# Patient Record
Sex: MALE | Race: OTHER | NOT HISPANIC OR LATINO | URBAN - METROPOLITAN AREA
[De-identification: names, ages, dates, MRNs, and addresses within clinical notes are randomized per-mention and may not be internally consistent; named-entity substitution may affect disease eponyms.]

---

## 2017-04-14 ENCOUNTER — OUTPATIENT (OUTPATIENT)
Dept: OUTPATIENT SERVICES | Facility: HOSPITAL | Age: 57
LOS: 1 days | Discharge: HOME | End: 2017-04-14

## 2017-04-17 LAB — SURGICAL PATHOLOGY STUDY: SIGNIFICANT CHANGE UP

## 2017-04-18 LAB — COMPN STONE: SIGNIFICANT CHANGE UP

## 2017-04-19 DIAGNOSIS — K21.9 GASTRO-ESOPHAGEAL REFLUX DISEASE WITHOUT ESOPHAGITIS: ICD-10-CM

## 2017-04-19 DIAGNOSIS — E11.9 TYPE 2 DIABETES MELLITUS WITHOUT COMPLICATIONS: ICD-10-CM

## 2017-04-19 DIAGNOSIS — E78.5 HYPERLIPIDEMIA, UNSPECIFIED: ICD-10-CM

## 2017-04-19 DIAGNOSIS — I10 ESSENTIAL (PRIMARY) HYPERTENSION: ICD-10-CM

## 2017-04-19 DIAGNOSIS — N40.0 BENIGN PROSTATIC HYPERPLASIA WITHOUT LOWER URINARY TRACT SYMPTOMS: ICD-10-CM

## 2017-04-19 DIAGNOSIS — Z79.84 LONG TERM (CURRENT) USE OF ORAL HYPOGLYCEMIC DRUGS: ICD-10-CM

## 2017-04-19 DIAGNOSIS — N20.1 CALCULUS OF URETER: ICD-10-CM

## 2020-07-11 PROBLEM — Z87.898 HISTORY OF ELEVATED PROSTATE SPECIFIC ANTIGEN (PSA): Status: RESOLVED | Noted: 2020-07-11 | Resolved: 2020-07-11

## 2020-07-11 PROBLEM — N20.9 UROLITHIASIS: Status: ACTIVE | Noted: 2020-07-11

## 2020-07-13 ENCOUNTER — APPOINTMENT (OUTPATIENT)
Dept: UROLOGY | Facility: CLINIC | Age: 60
End: 2020-07-13
Payer: COMMERCIAL

## 2020-07-13 VITALS
HEART RATE: 76 BPM | DIASTOLIC BLOOD PRESSURE: 86 MMHG | TEMPERATURE: 97.7 F | HEIGHT: 72 IN | BODY MASS INDEX: 27.77 KG/M2 | SYSTOLIC BLOOD PRESSURE: 135 MMHG | WEIGHT: 205 LBS

## 2020-07-13 VITALS — TEMPERATURE: 97.7 F

## 2020-07-13 DIAGNOSIS — Z80.1 FAMILY HISTORY OF MALIGNANT NEOPLASM OF TRACHEA, BRONCHUS AND LUNG: ICD-10-CM

## 2020-07-13 DIAGNOSIS — Z78.9 OTHER SPECIFIED HEALTH STATUS: ICD-10-CM

## 2020-07-13 DIAGNOSIS — Z87.898 PERSONAL HISTORY OF OTHER SPECIFIED CONDITIONS: ICD-10-CM

## 2020-07-13 DIAGNOSIS — K21.9 GASTRO-ESOPHAGEAL REFLUX DISEASE W/OUT ESOPHAGITIS: ICD-10-CM

## 2020-07-13 DIAGNOSIS — I86.1 SCROTAL VARICES: ICD-10-CM

## 2020-07-13 DIAGNOSIS — N20.9 URINARY CALCULUS, UNSPECIFIED: ICD-10-CM

## 2020-07-13 LAB
BILIRUB UR QL STRIP: NORMAL
CLARITY UR: CLEAR
COLLECTION METHOD: NORMAL
GLUCOSE UR-MCNC: NORMAL
HCG UR QL: 0.2 EU/DL
HGB UR QL STRIP.AUTO: NORMAL
KETONES UR-MCNC: NORMAL
LEUKOCYTE ESTERASE UR QL STRIP: NORMAL
NITRITE UR QL STRIP: NORMAL
PH UR STRIP: 6
PROT UR STRIP-MCNC: NORMAL
SP GR UR STRIP: 1

## 2020-07-13 PROCEDURE — 81003 URINALYSIS AUTO W/O SCOPE: CPT | Mod: QW

## 2020-07-13 PROCEDURE — 76857 US EXAM PELVIC LIMITED: CPT

## 2020-07-13 PROCEDURE — 99205 OFFICE O/P NEW HI 60 MIN: CPT | Mod: 25

## 2020-07-13 RX ORDER — METFORMIN HYDROCHLORIDE 500 MG/1
500 TABLET, COATED ORAL
Qty: 180 | Refills: 0 | Status: ACTIVE | COMMUNITY
Start: 2019-12-29

## 2020-07-13 RX ORDER — EPLERENONE 25 MG/1
25 TABLET, COATED ORAL
Qty: 180 | Refills: 0 | Status: ACTIVE | COMMUNITY
Start: 2020-03-05

## 2020-07-13 RX ORDER — RABEPRAZOLE SODIUM 20 MG/1
20 TABLET, DELAYED RELEASE ORAL
Qty: 90 | Refills: 0 | Status: ACTIVE | COMMUNITY
Start: 2020-03-16

## 2020-07-13 NOTE — PHYSICAL EXAM
[Normal Appearance] : normal appearance [General Appearance - Well Nourished] : well nourished [General Appearance - Well Developed] : well developed [Well Groomed] : well groomed [General Appearance - In No Acute Distress] : no acute distress [Edema] : no peripheral edema [Respiration, Rhythm And Depth] : normal respiratory rhythm and effort [Exaggerated Use Of Accessory Muscles For Inspiration] : no accessory muscle use [Abdomen Tenderness] : non-tender [Abdomen Soft] : soft [Abdomen Mass (___ Cm)] : no abdominal mass palpated [Abdomen Hernia] : no hernia was discovered [Costovertebral Angle Tenderness] : no ~M costovertebral angle tenderness [Urethral Meatus] : meatus normal [Urinary Bladder Findings] : the bladder was normal on palpation [Scrotum] : the scrotum was normal [Penis Abnormality] : normal circumcised penis [Testes Tenderness] : no tenderness of the testes [Epididymis] : the epididymides were normal [No Prostate Nodules] : no prostate nodules [Prostate Tenderness] : the prostate was not tender [Testes Mass (___cm)] : there were no testicular masses [Normal Station and Gait] : the gait and station were normal for the patient's age [] : no rash [Skin Color & Pigmentation] : normal skin color and pigmentation [Affect] : the affect was normal [No Focal Deficits] : no focal deficits [Oriented To Time, Place, And Person] : oriented to person, place, and time [Not Anxious] : not anxious [Mood] : the mood was normal [No Palpable Adenopathy] : no palpable adenopathy [FreeTextEntry1] : as above

## 2020-07-13 NOTE — ASSESSMENT
[FreeTextEntry1] : I discussed the findings and options with Mr. ALLEN GILMAN in detail.\par He will continue with the Viagra 50 mg prn for the ED.\par Regarding the urinary retention, I recommended that he begin alfuzosin and I outlined the potential common side effects.\par \par I will call Mr. Gilman with his PSA.  Providing it is normal, I would like to see him in 6mths (bladder sono). In the meantime, I have ordered a renal sonogram to evaluate the history of urolithiasis.

## 2020-07-13 NOTE — LETTER BODY
[Dear  ___] : Dear  [unfilled], [Sincerely,] : Sincerely, [Please see my note below.] : Please see my note below. [Consult Closing:] : Thank you very much for allowing me to participate in the care of this patient.  If you have any questions, please do not hesitate to contact me. [FreeTextEntry3] : Ab Simon MD, FACS\par

## 2020-07-13 NOTE — HISTORY OF PRESENT ILLNESS
[FreeTextEntry1] : Mr. ALLEN GILMAN comes in today for a urologic evaluation.  He presents with moderate lower urinary tract symptoms (obstructive and irritative).\par IPSS: 12/35\par Sono: 291cc PVR; 39cc prostate\par \par Mr. Gilman also has a history of urolithiasis and underwent ureteroscopies in December 2015 and April 2017.  He has been asymptomatic.\par \par His erectile dysfunction is managed with sildenafil.  Although he gets a headache from this, the medication is effective and he uses it.\par \par PSAs:  6/5/19--2.25; 4/19/18--1.37; 4/28/10--5.2\par \par Prostate bx:  5/4/10--BPH\par \par CT abd/pelvis:  4/8/17--8mm right UVJ stone (830HU); 11/13/15--7mm left distal ureteral stone.\par \par MSE 12/6/16--Elevated urine calcium and oxalate\par \par Stone analysis:  12/2/15--1000% Ca++oxalate \par \par

## 2020-07-14 LAB — PSA SERPL-MCNC: 3.14 NG/ML

## 2020-08-20 DIAGNOSIS — Z00.00 ENCOUNTER FOR GENERAL ADULT MEDICAL EXAMINATION W/OUT ABNORMAL FINDINGS: ICD-10-CM

## 2020-09-01 ENCOUNTER — APPOINTMENT (OUTPATIENT)
Dept: UROLOGY | Facility: CLINIC | Age: 60
End: 2020-09-01
Payer: COMMERCIAL

## 2020-09-01 ENCOUNTER — OUTPATIENT (OUTPATIENT)
Dept: OUTPATIENT SERVICES | Facility: HOSPITAL | Age: 60
LOS: 1 days | End: 2020-09-01
Payer: COMMERCIAL

## 2020-09-01 ENCOUNTER — APPOINTMENT (OUTPATIENT)
Dept: CT IMAGING | Facility: IMAGING CENTER | Age: 60
End: 2020-09-01
Payer: COMMERCIAL

## 2020-09-01 VITALS — TEMPERATURE: 97.4 F

## 2020-09-01 DIAGNOSIS — N28.89 OTHER SPECIFIED DISORDERS OF KIDNEY AND URETER: ICD-10-CM

## 2020-09-01 DIAGNOSIS — Z86.79 PERSONAL HISTORY OF OTHER DISEASES OF THE CIRCULATORY SYSTEM: ICD-10-CM

## 2020-09-01 DIAGNOSIS — Z86.39 PERSONAL HISTORY OF OTHER ENDOCRINE, NUTRITIONAL AND METABOLIC DISEASE: ICD-10-CM

## 2020-09-01 PROCEDURE — 71260 CT THORAX DX C+: CPT

## 2020-09-01 PROCEDURE — 99214 OFFICE O/P EST MOD 30 MIN: CPT

## 2020-09-01 PROCEDURE — 71260 CT THORAX DX C+: CPT | Mod: 26

## 2020-09-01 PROCEDURE — 82565 ASSAY OF CREATININE: CPT

## 2020-09-01 NOTE — HISTORY OF PRESENT ILLNESS
[FreeTextEntry1] : Patient is a 59 yo male with incidentally discovered 5.5 left renal mass on CT obtained for evaluation of kidney stones. Patient has history of nephrolithiasis with last intervention in 2017. Patient denies pain, hematuria, dysuria. Patient feels well currently.\par Patient with no smoking history, though father smoking\par NKDA\par Family history of lung cancer - father\par D2M,  HTN, stefan-orbital edema

## 2020-09-01 NOTE — ASSESSMENT
[FreeTextEntry1] : Pt is a 59 yo male with a history of left cystic renal lesion discovered incidentally on CT, 5.5 cm in size. Small nodules in lung.\par - Chest CT.\par - Discussed possible removal. \par \par renal mass\par We reviewed the images and reports . We reviewed the possibe underlying histology of solid enhancing renal masses . We discussed the risk of malignancy  vs benign . We discussed the possibility/ role of percutaneous biopsy ,with the associated risks, benefits , complications and accuracy issues ( risk of false negative). \par The natural history and biology of renal cell carcinoma was discussed. \par Options were reviewed including ,not limited to,active surveillance, surgical extirpation and ablation . The risk of tumor growth and metastasis on active surveillance were reviewed .\par Options were reviewed including , not limited to,active surveillance ( AS ) surgical extirpation and ablation. The risk of tumor growth and metastasis on AS could mean missing the opportunity for cure was discussed \par With respect to treatment we reviewed  ablation ( cryoablation, radiofrequency ablation) risks of recurrence,opportunities for salvage treatment and imaging requirements for follow up based on he pathological findings . Oncologic outcomes for ablation were reviewed .\par With respect for surgery we reviewed nephron sparing surgery vs radical nephrectomy  as well as minimally invasive laparoscopic  approach surgery and the possibility of converting to an open procedure . \par Risks of surgical complications were reviewed, including but not limited to : bleeding/ life threatening hemorrhage, vascular/bowel/adjacent visceral organ injury,trocar access injury, the possibility of recognized vs unrecognized delayed recognition injury, risks of thermal /blunt/sharp/retraction injury. We reviewed the risk of DVT, PE, MI ,death,risks of cardiopulmonary/anesthesia related complications,positional injury,infection/collection/abscess,wound complications/dehiscence,urinoma/fistula, ureteral injury/obstruction as well as other complications \par Written materials regarding preop clear liquid diet and bowel prep were given to patient . \par Booking form entered

## 2020-09-01 NOTE — ASSESSMENT
[FreeTextEntry1] : Pt is a 61 yo male with a history of left cystic renal lesion discovered incidentally on CT, 5.5 cm in size. Small nodules in lung.\par - Chest CT.\par - Discussed possible removal. \par \par renal mass\par We reviewed the images and reports . We reviewed the possibe underlying histology of solid enhancing renal masses . We discussed the risk of malignancy  vs benign . We discussed the possibility/ role of percutaneous biopsy ,with the associated risks, benefits , complications and accuracy issues ( risk of false negative). \par The natural history and biology of renal cell carcinoma was discussed. \par Options were reviewed including ,not limited to,active surveillance, surgical extirpation and ablation . The risk of tumor growth and metastasis on active surveillance were reviewed .\par Options were reviewed including , not limited to,active surveillance ( AS ) surgical extirpation and ablation. The risk of tumor growth and metastasis on AS could mean missing the opportunity for cure was discussed \par With respect to treatment we reviewed  ablation ( cryoablation, radiofrequency ablation) risks of recurrence,opportunities for salvage treatment and imaging requirements for follow up based on he pathological findings . Oncologic outcomes for ablation were reviewed .\par With respect for surgery we reviewed nephron sparing surgery vs radical nephrectomy  as well as minimally invasive laparoscopic  approach surgery and the possibility of converting to an open procedure . \par Risks of surgical complications were reviewed, including but not limited to : bleeding/ life threatening hemorrhage, vascular/bowel/adjacent visceral organ injury,trocar access injury, the possibility of recognized vs unrecognized delayed recognition injury, risks of thermal /blunt/sharp/retraction injury. We reviewed the risk of DVT, PE, MI ,death,risks of cardiopulmonary/anesthesia related complications,positional injury,infection/collection/abscess,wound complications/dehiscence,urinoma/fistula, ureteral injury/obstruction as well as other complications \par Written materials regarding preop clear liquid diet and bowel prep were given to patient . \par Booking form entered

## 2020-09-01 NOTE — HISTORY OF PRESENT ILLNESS
[FreeTextEntry1] : Patient is a 61 yo male with incidentally discovered 5.5 left renal mass on CT obtained for evaluation of kidney stones. Patient has history of nephrolithiasis with last intervention in 2017. Patient denies pain, hematuria, dysuria. Patient feels well currently.\par Patient with no smoking history, though father smoking\par NKDA\par Family history of lung cancer - father\par D2M,  HTN, stefan-orbital edema

## 2020-09-04 ENCOUNTER — OUTPATIENT (OUTPATIENT)
Dept: OUTPATIENT SERVICES | Facility: HOSPITAL | Age: 60
LOS: 1 days | End: 2020-09-04

## 2020-09-04 VITALS
SYSTOLIC BLOOD PRESSURE: 130 MMHG | HEIGHT: 70 IN | DIASTOLIC BLOOD PRESSURE: 76 MMHG | RESPIRATION RATE: 18 BRPM | WEIGHT: 212.08 LBS | OXYGEN SATURATION: 98 % | HEART RATE: 95 BPM | TEMPERATURE: 98 F

## 2020-09-04 DIAGNOSIS — N28.89 OTHER SPECIFIED DISORDERS OF KIDNEY AND URETER: ICD-10-CM

## 2020-09-04 DIAGNOSIS — E11.9 TYPE 2 DIABETES MELLITUS WITHOUT COMPLICATIONS: ICD-10-CM

## 2020-09-04 DIAGNOSIS — K21.9 GASTRO-ESOPHAGEAL REFLUX DISEASE WITHOUT ESOPHAGITIS: ICD-10-CM

## 2020-09-04 DIAGNOSIS — Z01.818 ENCOUNTER FOR OTHER PREPROCEDURAL EXAMINATION: ICD-10-CM

## 2020-09-04 DIAGNOSIS — Z98.890 OTHER SPECIFIED POSTPROCEDURAL STATES: Chronic | ICD-10-CM

## 2020-09-04 LAB
ANION GAP SERPL CALC-SCNC: 15 MMO/L — HIGH (ref 7–14)
BLD GP AB SCN SERPL QL: NEGATIVE — SIGNIFICANT CHANGE UP
BUN SERPL-MCNC: 17 MG/DL — SIGNIFICANT CHANGE UP (ref 7–23)
CALCIUM SERPL-MCNC: 10.1 MG/DL — SIGNIFICANT CHANGE UP (ref 8.4–10.5)
CHLORIDE SERPL-SCNC: 101 MMOL/L — SIGNIFICANT CHANGE UP (ref 98–107)
CO2 SERPL-SCNC: 24 MMOL/L — SIGNIFICANT CHANGE UP (ref 22–31)
CREAT SERPL-MCNC: 1.08 MG/DL — SIGNIFICANT CHANGE UP (ref 0.5–1.3)
GLUCOSE SERPL-MCNC: 88 MG/DL — SIGNIFICANT CHANGE UP (ref 70–99)
HBA1C BLD-MCNC: 6.7 % — HIGH (ref 4–5.6)
HCT VFR BLD CALC: 39.7 % — SIGNIFICANT CHANGE UP (ref 39–50)
HGB BLD-MCNC: 13.1 G/DL — SIGNIFICANT CHANGE UP (ref 13–17)
MCHC RBC-ENTMCNC: 28.9 PG — SIGNIFICANT CHANGE UP (ref 27–34)
MCHC RBC-ENTMCNC: 33 % — SIGNIFICANT CHANGE UP (ref 32–36)
MCV RBC AUTO: 87.6 FL — SIGNIFICANT CHANGE UP (ref 80–100)
NRBC # FLD: 0 K/UL — SIGNIFICANT CHANGE UP (ref 0–0)
PLATELET # BLD AUTO: 304 K/UL — SIGNIFICANT CHANGE UP (ref 150–400)
PMV BLD: 12.2 FL — SIGNIFICANT CHANGE UP (ref 7–13)
POTASSIUM SERPL-MCNC: 4.1 MMOL/L — SIGNIFICANT CHANGE UP (ref 3.5–5.3)
POTASSIUM SERPL-SCNC: 4.1 MMOL/L — SIGNIFICANT CHANGE UP (ref 3.5–5.3)
RBC # BLD: 4.53 M/UL — SIGNIFICANT CHANGE UP (ref 4.2–5.8)
RBC # FLD: 12.7 % — SIGNIFICANT CHANGE UP (ref 10.3–14.5)
RH IG SCN BLD-IMP: POSITIVE — SIGNIFICANT CHANGE UP
SODIUM SERPL-SCNC: 140 MMOL/L — SIGNIFICANT CHANGE UP (ref 135–145)
WBC # BLD: 8.48 K/UL — SIGNIFICANT CHANGE UP (ref 3.8–10.5)
WBC # FLD AUTO: 8.48 K/UL — SIGNIFICANT CHANGE UP (ref 3.8–10.5)

## 2020-09-04 RX ORDER — SODIUM CHLORIDE 9 MG/ML
1000 INJECTION, SOLUTION INTRAVENOUS
Refills: 0 | Status: DISCONTINUED | OUTPATIENT
Start: 2020-09-09 | End: 2020-09-10

## 2020-09-04 NOTE — H&P PST ADULT - NSICDXPROBLEM_GEN_ALL_CORE_FT
PROBLEM DIAGNOSES  Problem: Other specified disorders of kidney and ureter  Assessment and Plan: Left laparoscopic partial nephrectomy   Medical clearance as per Dr Serrano   Chlorhexidine wash with verbal and written instructions , teach back appropriate.     Problem: GERD (gastroesophageal reflux disease)  Assessment and Plan: ptis npo from 11 pm the night before surgery . Pt instructed to take aciphex day of surgery . PROBLEM DIAGNOSES  Problem: Other specified disorders of kidney and ureter  Assessment and Plan: Left laparoscopic partial nephrectomy   Medical clearance as per Dr Serrano   Chlorhexidine wash with verbal and written instructions , teach back appropriate.     Problem: GERD (gastroesophageal reflux disease)  Assessment and Plan: pt is npo from 11 pm the night before surgery . Pt instructed to take aciphex day of surgery .     Problem: Type 2 diabetes mellitus  Assessment and Plan: last dose metformin 9/8 in am . OR faxed . STAT FS upon admit ,monitor FS during hospital stay .

## 2020-09-04 NOTE — H&P PST ADULT - MUSCULOSKELETAL
detailed exam ROM intact/no calf tenderness/no joint warmth/normal strength/no joint swelling/no joint erythema details…

## 2020-09-04 NOTE — H&P PST ADULT - NSICDXPASTMEDICALHX_GEN_ALL_CORE_FT
PAST MEDICAL HISTORY:  Enlarged prostate     H/O gastroesophageal reflux (GERD)     Kidney stone 2015    Type 2 diabetes mellitus

## 2020-09-04 NOTE — H&P PST ADULT - HISTORY OF PRESENT ILLNESS
This is a 60 y.o. male who presented for routine renal sono, follow -up  CT of abdomen . Pt evaluated , pt has other specified disorders of kidney and ureter . Pt now for surgery .

## 2020-09-04 NOTE — H&P PST ADULT - NSANTHOSAYNRD_GEN_A_CORE
No. STEFANIE screening performed.  STOP BANG Legend: 0-2 = LOW Risk; 3-4 = INTERMEDIATE Risk; 5-8 = HIGH Risk

## 2020-09-04 NOTE — H&P PST ADULT - RS GEN PE MLT RESP DETAILS PC
clear to auscultation bilaterally/no rhonchi/no wheezes/respirations non-labored/breath sounds equal/no rales

## 2020-09-06 LAB
CULTURE RESULTS: SIGNIFICANT CHANGE UP
SPECIMEN SOURCE: SIGNIFICANT CHANGE UP

## 2020-09-08 ENCOUNTER — TRANSCRIPTION ENCOUNTER (OUTPATIENT)
Age: 60
End: 2020-09-08

## 2020-09-08 NOTE — ASU PATIENT PROFILE, ADULT - NS PREOP MEDICATION GIVEN
Patient last seen [facility] of this year and was to return in 3 months.  Rx for a two month supply will be done today but patient will need an appointment prior to any further refills of this medication.    as per PST

## 2020-09-08 NOTE — ASU PATIENT PROFILE, ADULT - PMH
None
Enlarged prostate    H/O gastroesophageal reflux (GERD)    Kidney stone  2015  Type 2 diabetes mellitus

## 2020-09-09 ENCOUNTER — RESULT REVIEW (OUTPATIENT)
Age: 60
End: 2020-09-09

## 2020-09-09 ENCOUNTER — APPOINTMENT (OUTPATIENT)
Dept: UROLOGY | Facility: HOSPITAL | Age: 60
End: 2020-09-09

## 2020-09-09 ENCOUNTER — INPATIENT (INPATIENT)
Facility: HOSPITAL | Age: 60
LOS: 0 days | Discharge: HOME | End: 2020-09-10
Attending: UROLOGY | Admitting: UROLOGY
Payer: COMMERCIAL

## 2020-09-09 VITALS
OXYGEN SATURATION: 97 % | DIASTOLIC BLOOD PRESSURE: 70 MMHG | RESPIRATION RATE: 17 BRPM | WEIGHT: 212.08 LBS | SYSTOLIC BLOOD PRESSURE: 137 MMHG | HEIGHT: 70 IN | TEMPERATURE: 98 F | HEART RATE: 90 BPM

## 2020-09-09 DIAGNOSIS — N28.89 OTHER SPECIFIED DISORDERS OF KIDNEY AND URETER: ICD-10-CM

## 2020-09-09 DIAGNOSIS — Z98.890 OTHER SPECIFIED POSTPROCEDURAL STATES: Chronic | ICD-10-CM

## 2020-09-09 LAB
GLUCOSE BLDC GLUCOMTR-MCNC: 126 MG/DL — HIGH (ref 70–99)
GLUCOSE BLDC GLUCOMTR-MCNC: 134 MG/DL — HIGH (ref 70–99)
GLUCOSE BLDC GLUCOMTR-MCNC: 155 MG/DL — HIGH (ref 70–99)
GLUCOSE BLDC GLUCOMTR-MCNC: 221 MG/DL — HIGH (ref 70–99)
RH IG SCN BLD-IMP: POSITIVE — SIGNIFICANT CHANGE UP

## 2020-09-09 PROCEDURE — 88312 SPECIAL STAINS GROUP 1: CPT | Mod: 26

## 2020-09-09 PROCEDURE — 76998 US GUIDE INTRAOP: CPT | Mod: 26

## 2020-09-09 PROCEDURE — 88331 PATH CONSLTJ SURG 1 BLK 1SPC: CPT | Mod: 26

## 2020-09-09 PROCEDURE — 50543 LAPARO PARTIAL NEPHRECTOMY: CPT | Mod: LT

## 2020-09-09 PROCEDURE — 99223 1ST HOSP IP/OBS HIGH 75: CPT

## 2020-09-09 PROCEDURE — 88307 TISSUE EXAM BY PATHOLOGIST: CPT | Mod: 26

## 2020-09-09 PROCEDURE — 88305 TISSUE EXAM BY PATHOLOGIST: CPT | Mod: 26

## 2020-09-09 RX ORDER — ACETAMINOPHEN 500 MG
650 TABLET ORAL EVERY 6 HOURS
Refills: 0 | Status: DISCONTINUED | OUTPATIENT
Start: 2020-09-09 | End: 2020-09-09

## 2020-09-09 RX ORDER — DEXTROSE 50 % IN WATER 50 %
12.5 SYRINGE (ML) INTRAVENOUS ONCE
Refills: 0 | Status: DISCONTINUED | OUTPATIENT
Start: 2020-09-09 | End: 2020-09-10

## 2020-09-09 RX ORDER — GLUCAGON INJECTION, SOLUTION 0.5 MG/.1ML
1 INJECTION, SOLUTION SUBCUTANEOUS ONCE
Refills: 0 | Status: DISCONTINUED | OUTPATIENT
Start: 2020-09-09 | End: 2020-09-10

## 2020-09-09 RX ORDER — HYDROMORPHONE HYDROCHLORIDE 2 MG/ML
0.5 INJECTION INTRAMUSCULAR; INTRAVENOUS; SUBCUTANEOUS
Refills: 0 | Status: DISCONTINUED | OUTPATIENT
Start: 2020-09-09 | End: 2020-09-09

## 2020-09-09 RX ORDER — HEPARIN SODIUM 5000 [USP'U]/ML
5000 INJECTION INTRAVENOUS; SUBCUTANEOUS EVERY 8 HOURS
Refills: 0 | Status: DISCONTINUED | OUTPATIENT
Start: 2020-09-09 | End: 2020-09-10

## 2020-09-09 RX ORDER — METFORMIN HYDROCHLORIDE 850 MG/1
1 TABLET ORAL
Qty: 0 | Refills: 0 | DISCHARGE

## 2020-09-09 RX ORDER — INSULIN LISPRO 100/ML
VIAL (ML) SUBCUTANEOUS
Refills: 0 | Status: DISCONTINUED | OUTPATIENT
Start: 2020-09-09 | End: 2020-09-10

## 2020-09-09 RX ORDER — SODIUM CHLORIDE 9 MG/ML
1000 INJECTION, SOLUTION INTRAVENOUS
Refills: 0 | Status: DISCONTINUED | OUTPATIENT
Start: 2020-09-09 | End: 2020-09-10

## 2020-09-09 RX ORDER — PANTOPRAZOLE SODIUM 20 MG/1
40 TABLET, DELAYED RELEASE ORAL
Refills: 0 | Status: DISCONTINUED | OUTPATIENT
Start: 2020-09-09 | End: 2020-09-10

## 2020-09-09 RX ORDER — EPLERENONE 50 MG/1
1 TABLET, FILM COATED ORAL
Qty: 0 | Refills: 0 | DISCHARGE

## 2020-09-09 RX ORDER — ONDANSETRON 8 MG/1
4 TABLET, FILM COATED ORAL ONCE
Refills: 0 | Status: DISCONTINUED | OUTPATIENT
Start: 2020-09-09 | End: 2020-09-09

## 2020-09-09 RX ORDER — DEXTROSE 50 % IN WATER 50 %
25 SYRINGE (ML) INTRAVENOUS ONCE
Refills: 0 | Status: DISCONTINUED | OUTPATIENT
Start: 2020-09-09 | End: 2020-09-10

## 2020-09-09 RX ORDER — SENNA PLUS 8.6 MG/1
2 TABLET ORAL AT BEDTIME
Refills: 0 | Status: DISCONTINUED | OUTPATIENT
Start: 2020-09-09 | End: 2020-09-10

## 2020-09-09 RX ORDER — ALFUZOSIN HYDROCHLORIDE 10 MG/1
1 TABLET, EXTENDED RELEASE ORAL
Qty: 0 | Refills: 0 | DISCHARGE

## 2020-09-09 RX ORDER — OXYCODONE HYDROCHLORIDE 5 MG/1
5 TABLET ORAL EVERY 6 HOURS
Refills: 0 | Status: DISCONTINUED | OUTPATIENT
Start: 2020-09-09 | End: 2020-09-10

## 2020-09-09 RX ORDER — ACETAMINOPHEN 500 MG
1000 TABLET ORAL EVERY 6 HOURS
Refills: 0 | Status: COMPLETED | OUTPATIENT
Start: 2020-09-09 | End: 2020-09-10

## 2020-09-09 RX ORDER — OXYCODONE HYDROCHLORIDE 5 MG/1
10 TABLET ORAL EVERY 6 HOURS
Refills: 0 | Status: DISCONTINUED | OUTPATIENT
Start: 2020-09-09 | End: 2020-09-10

## 2020-09-09 RX ORDER — ATORVASTATIN CALCIUM 80 MG/1
1 TABLET, FILM COATED ORAL
Qty: 0 | Refills: 0 | DISCHARGE

## 2020-09-09 RX ORDER — ONDANSETRON 8 MG/1
4 TABLET, FILM COATED ORAL EVERY 6 HOURS
Refills: 0 | Status: DISCONTINUED | OUTPATIENT
Start: 2020-09-09 | End: 2020-09-10

## 2020-09-09 RX ORDER — DEXTROSE 50 % IN WATER 50 %
15 SYRINGE (ML) INTRAVENOUS ONCE
Refills: 0 | Status: DISCONTINUED | OUTPATIENT
Start: 2020-09-09 | End: 2020-09-10

## 2020-09-09 RX ORDER — RABEPRAZOLE 20 MG/1
1 TABLET, DELAYED RELEASE ORAL
Qty: 0 | Refills: 0 | DISCHARGE

## 2020-09-09 RX ADMIN — Medication 1: at 16:53

## 2020-09-09 RX ADMIN — SODIUM CHLORIDE 125 MILLILITER(S): 9 INJECTION, SOLUTION INTRAVENOUS at 10:14

## 2020-09-09 RX ADMIN — HYDROMORPHONE HYDROCHLORIDE 0.5 MILLIGRAM(S): 2 INJECTION INTRAMUSCULAR; INTRAVENOUS; SUBCUTANEOUS at 10:36

## 2020-09-09 RX ADMIN — HYDROMORPHONE HYDROCHLORIDE 0.5 MILLIGRAM(S): 2 INJECTION INTRAMUSCULAR; INTRAVENOUS; SUBCUTANEOUS at 10:50

## 2020-09-09 RX ADMIN — Medication 2: at 11:36

## 2020-09-09 RX ADMIN — HEPARIN SODIUM 5000 UNIT(S): 5000 INJECTION INTRAVENOUS; SUBCUTANEOUS at 15:39

## 2020-09-09 RX ADMIN — OXYCODONE HYDROCHLORIDE 5 MILLIGRAM(S): 5 TABLET ORAL at 14:25

## 2020-09-09 RX ADMIN — HEPARIN SODIUM 5000 UNIT(S): 5000 INJECTION INTRAVENOUS; SUBCUTANEOUS at 22:00

## 2020-09-09 RX ADMIN — OXYCODONE HYDROCHLORIDE 5 MILLIGRAM(S): 5 TABLET ORAL at 13:33

## 2020-09-09 RX ADMIN — HYDROMORPHONE HYDROCHLORIDE 0.5 MILLIGRAM(S): 2 INJECTION INTRAMUSCULAR; INTRAVENOUS; SUBCUTANEOUS at 10:21

## 2020-09-09 RX ADMIN — HYDROMORPHONE HYDROCHLORIDE 0.5 MILLIGRAM(S): 2 INJECTION INTRAMUSCULAR; INTRAVENOUS; SUBCUTANEOUS at 10:30

## 2020-09-09 RX ADMIN — Medication 400 MILLIGRAM(S): at 22:00

## 2020-09-09 RX ADMIN — Medication 400 MILLIGRAM(S): at 16:09

## 2020-09-09 NOTE — CONSULT NOTE ADULT - ASSESSMENT
60 yr old man PMH HTN, GERD, BPH, nephrolithiasis s/p laser therapy in 2017, Type 2 DM on metformin, HLD presents for surgery after discovery of a left renal mass. Patient is now s/p left partial nephrectomy on 9/9.    # left renal mass  - s/p left partial nephrectomy on 9/9, EBL 300cc  - currently with najera  - f/u surgical path  - postop management per urology  - monitor for flatus and BM postop  - pain management per primary team  - awaiting postop labs    # Type 2 DM  - A1C: 6.7  - goal -180  - on metformin at home  - consistent carb diet  - low correction SSI premeals for now    # HTN  - goal SBP 130s  - hold eplerenone for now. Check BMP to monitor Cr    # HLD  - continue lipitor    # GERD  - continue aciphex: on protonix inpatient as therapeutic interchange    # prophylaxis  OOB as tolerated  consistent carb clear liquid diet, advance as tolerated  on HSQ for DVT ppx

## 2020-09-09 NOTE — CONSULT NOTE ADULT - SUBJECTIVE AND OBJECTIVE BOX
CHIEF COMPLAINT: Patient is a 60y old  Male who presents with a chief complaint of " I am having surgery for the kidney ". (04 Sep 2020 14:20)      HPI: 60 yr old man PMH GERD, BPH, nephrolithiasis s/p laser therapy in 2017, Type 2 DM on metformin, HLD presents for surgery after discovery of a left renal mass. Patient is now s/p left partial nephrectomy on 9/9. Patient reports 6/10 abdominal pain described as pressure. Denies N/V. Has not passed flatus yet.      Pain Symptoms if applicable:             	                         none	   mild         moderate         severe  	                            0	    1-3	     4-6	         7-10  Pain:  Location:	  Modifying factors:	  Associated symptoms:	    Allergies    Kiwi (Pruritus)  No Known Drug Allergies  Peaches (Pruritus)    Intolerances        HOME MEDICATIONS: [x] Reviewed    MEDICATIONS  (STANDING):  dextrose 5%. 1000 milliLiter(s) (50 mL/Hr) IV Continuous <Continuous>  dextrose 50% Injectable 12.5 Gram(s) IV Push once  dextrose 50% Injectable 25 Gram(s) IV Push once  heparin   Injectable 5000 Unit(s) SubCutaneous every 8 hours  insulin lispro (HumaLOG) corrective regimen sliding scale   SubCutaneous three times a day before meals  lactated ringers. 1000 milliLiter(s) (30 mL/Hr) IV Continuous <Continuous>  lactated ringers. 1000 milliLiter(s) (125 mL/Hr) IV Continuous <Continuous>  pantoprazole    Tablet 40 milliGRAM(s) Oral before breakfast    MEDICATIONS  (PRN):  acetaminophen   Tablet .. 650 milliGRAM(s) Oral every 6 hours PRN Temp greater or equal to 38C (100.4F), Mild Pain (1 - 3)  dextrose 40% Gel 15 Gram(s) Oral once PRN Blood Glucose LESS THAN 70 milliGRAM(s)/deciliter  glucagon  Injectable 1 milliGRAM(s) IntraMuscular once PRN Glucose LESS THAN 70 milligrams/deciliter  HYDROmorphone  Injectable 0.5 milliGRAM(s) IV Push every 10 minutes PRN Moderate Pain (4 - 6)  ondansetron Injectable 4 milliGRAM(s) IV Push every 6 hours PRN Nausea and/or Vomiting  ondansetron Injectable 4 milliGRAM(s) IV Push once PRN Nausea and/or Vomiting  oxyCODONE    IR 5 milliGRAM(s) Oral every 6 hours PRN Moderate Pain (4 - 6)  oxyCODONE    IR 10 milliGRAM(s) Oral every 6 hours PRN Severe Pain (7 - 10)  senna 2 Tablet(s) Oral at bedtime PRN Constipation      PAST MEDICAL & SURGICAL HISTORY:  H/O gastroesophageal reflux (GERD)  Type 2 diabetes mellitus  Enlarged prostate  Kidney stone: 2015  H/O cystoscopy: with removal of kidney stone ; times 2 ; 2015, 2017  [X ] Reviewed     SOCIAL HISTORY:  [x] Substance abuse: denies  [x] Tobacco: denies  [x] Alcohol use: denies  lives with family, works for [employer]. Does not use cane or walker    FAMILY HISTORY:  father had lung cancer, DM and heart disease.  [x] No pertinent family history in first degree relatives     REVIEW OF SYSTEMS:    [x] All other ROS negative  [  ] Unable to obtain due to poor mental status    Vital Signs Last 24 Hrs  T(C): 36.6 (09 Sep 2020 12:45), Max: 36.9 (09 Sep 2020 06:07)  T(F): 97.8 (09 Sep 2020 12:45), Max: 98.4 (09 Sep 2020 06:07)  HR: 100 (09 Sep 2020 13:00) (85 - 101)  BP: 130/66 (09 Sep 2020 13:00) (129/68 - 147/73)  BP(mean): 78 (09 Sep 2020 13:00) (75 - 91)  RR: 16 (09 Sep 2020 13:00) (9 - 22)  SpO2: 94% (09 Sep 2020 13:00) (92% - 100%)    PHYSICAL EXAM:    GENERAL: NAD, on room air  HEENT: sclera clear, eyes tracking, dry MM  RESPIRATORY: Clear to auscultation bilaterally; No crackles or wheezing  CARDIOVASCULAR: s1/s2, RRR, no murmurs appreciated  GASTROINTESTINAL: Soft, left sided TTP, slightly distended; Bowel sounds present, incision sites C/D/I  GENITOURINARY: + najera draining yellow urine  EXTREMITIES:  WWP, no edema  NERVOUS SYSTEM:  awake, alert, no gross deficits  PSYCH: calm, cooperative  SKIN: No rashes or lesions; Incisions C/D/I    LABS:      CAPILLARY BLOOD GLUCOSE      POCT Blood Glucose.: 221 mg/dL (09 Sep 2020 11:27)      RADIOLOGY & ADDITIONAL STUDIES:    EKG:   Personally Reviewed:  [x] YES     Imaging:   Personally Reviewed:  [x] YES               [ ] Consultant(s) Notes Reviewed  [x] Care Discussed with Consultants/Other Providers:

## 2020-09-10 ENCOUNTER — TRANSCRIPTION ENCOUNTER (OUTPATIENT)
Age: 60
End: 2020-09-10

## 2020-09-10 VITALS
TEMPERATURE: 98 F | OXYGEN SATURATION: 94 % | SYSTOLIC BLOOD PRESSURE: 131 MMHG | DIASTOLIC BLOOD PRESSURE: 58 MMHG | RESPIRATION RATE: 16 BRPM | HEART RATE: 73 BPM

## 2020-09-10 LAB
ANION GAP SERPL CALC-SCNC: 9 MMO/L — SIGNIFICANT CHANGE UP (ref 7–14)
BUN SERPL-MCNC: 18 MG/DL — SIGNIFICANT CHANGE UP (ref 7–23)
CALCIUM SERPL-MCNC: 9.1 MG/DL — SIGNIFICANT CHANGE UP (ref 8.4–10.5)
CHLORIDE SERPL-SCNC: 103 MMOL/L — SIGNIFICANT CHANGE UP (ref 98–107)
CO2 SERPL-SCNC: 25 MMOL/L — SIGNIFICANT CHANGE UP (ref 22–31)
CREAT SERPL-MCNC: 1.44 MG/DL — HIGH (ref 0.5–1.3)
GLUCOSE BLDC GLUCOMTR-MCNC: 114 MG/DL — HIGH (ref 70–99)
GLUCOSE BLDC GLUCOMTR-MCNC: 115 MG/DL — HIGH (ref 70–99)
GLUCOSE BLDC GLUCOMTR-MCNC: 128 MG/DL — HIGH (ref 70–99)
GLUCOSE SERPL-MCNC: 112 MG/DL — HIGH (ref 70–99)
HCT VFR BLD CALC: 34.4 % — LOW (ref 39–50)
HGB BLD-MCNC: 11.2 G/DL — LOW (ref 13–17)
MCHC RBC-ENTMCNC: 29.1 PG — SIGNIFICANT CHANGE UP (ref 27–34)
MCHC RBC-ENTMCNC: 32.6 % — SIGNIFICANT CHANGE UP (ref 32–36)
MCV RBC AUTO: 89.4 FL — SIGNIFICANT CHANGE UP (ref 80–100)
NRBC # FLD: 0 K/UL — SIGNIFICANT CHANGE UP (ref 0–0)
PLATELET # BLD AUTO: 254 K/UL — SIGNIFICANT CHANGE UP (ref 150–400)
PMV BLD: 11.6 FL — SIGNIFICANT CHANGE UP (ref 7–13)
POTASSIUM SERPL-MCNC: 4.5 MMOL/L — SIGNIFICANT CHANGE UP (ref 3.5–5.3)
POTASSIUM SERPL-SCNC: 4.5 MMOL/L — SIGNIFICANT CHANGE UP (ref 3.5–5.3)
RBC # BLD: 3.85 M/UL — LOW (ref 4.2–5.8)
RBC # FLD: 12.6 % — SIGNIFICANT CHANGE UP (ref 10.3–14.5)
SODIUM SERPL-SCNC: 137 MMOL/L — SIGNIFICANT CHANGE UP (ref 135–145)
WBC # BLD: 9.68 K/UL — SIGNIFICANT CHANGE UP (ref 3.8–10.5)
WBC # FLD AUTO: 9.68 K/UL — SIGNIFICANT CHANGE UP (ref 3.8–10.5)

## 2020-09-10 PROCEDURE — 99232 SBSQ HOSP IP/OBS MODERATE 35: CPT

## 2020-09-10 RX ORDER — OXYCODONE HYDROCHLORIDE 5 MG/1
1 TABLET ORAL
Qty: 10 | Refills: 0
Start: 2020-09-10

## 2020-09-10 RX ORDER — OXYCODONE HYDROCHLORIDE 5 MG/1
5 TABLET ORAL ONCE
Refills: 0 | Status: DISCONTINUED | OUTPATIENT
Start: 2020-09-10 | End: 2020-09-10

## 2020-09-10 RX ORDER — SODIUM CHLORIDE 9 MG/ML
1000 INJECTION, SOLUTION INTRAVENOUS
Refills: 0 | Status: DISCONTINUED | OUTPATIENT
Start: 2020-09-10 | End: 2020-09-10

## 2020-09-10 RX ORDER — ACETAMINOPHEN 500 MG
2 TABLET ORAL
Qty: 0 | Refills: 0 | DISCHARGE

## 2020-09-10 RX ORDER — ACETAMINOPHEN 500 MG
650 TABLET ORAL ONCE
Refills: 0 | Status: COMPLETED | OUTPATIENT
Start: 2020-09-10 | End: 2020-09-10

## 2020-09-10 RX ORDER — SENNA PLUS 8.6 MG/1
2 TABLET ORAL
Qty: 0 | Refills: 0 | DISCHARGE
Start: 2020-09-10

## 2020-09-10 RX ADMIN — PANTOPRAZOLE SODIUM 40 MILLIGRAM(S): 20 TABLET, DELAYED RELEASE ORAL at 05:32

## 2020-09-10 RX ADMIN — OXYCODONE HYDROCHLORIDE 5 MILLIGRAM(S): 5 TABLET ORAL at 16:53

## 2020-09-10 RX ADMIN — Medication 100 MILLIGRAM(S): at 12:48

## 2020-09-10 RX ADMIN — Medication 10 MILLIGRAM(S): at 07:04

## 2020-09-10 RX ADMIN — OXYCODONE HYDROCHLORIDE 10 MILLIGRAM(S): 5 TABLET ORAL at 06:30

## 2020-09-10 RX ADMIN — OXYCODONE HYDROCHLORIDE 10 MILLIGRAM(S): 5 TABLET ORAL at 13:30

## 2020-09-10 RX ADMIN — OXYCODONE HYDROCHLORIDE 10 MILLIGRAM(S): 5 TABLET ORAL at 07:04

## 2020-09-10 RX ADMIN — HEPARIN SODIUM 5000 UNIT(S): 5000 INJECTION INTRAVENOUS; SUBCUTANEOUS at 05:32

## 2020-09-10 RX ADMIN — HEPARIN SODIUM 5000 UNIT(S): 5000 INJECTION INTRAVENOUS; SUBCUTANEOUS at 12:51

## 2020-09-10 RX ADMIN — Medication 650 MILLIGRAM(S): at 16:56

## 2020-09-10 RX ADMIN — Medication 400 MILLIGRAM(S): at 11:04

## 2020-09-10 RX ADMIN — OXYCODONE HYDROCHLORIDE 5 MILLIGRAM(S): 5 TABLET ORAL at 16:23

## 2020-09-10 RX ADMIN — SODIUM CHLORIDE 75 MILLILITER(S): 9 INJECTION, SOLUTION INTRAVENOUS at 07:04

## 2020-09-10 RX ADMIN — Medication 400 MILLIGRAM(S): at 05:32

## 2020-09-10 RX ADMIN — OXYCODONE HYDROCHLORIDE 10 MILLIGRAM(S): 5 TABLET ORAL at 12:48

## 2020-09-10 RX ADMIN — SODIUM CHLORIDE 75 MILLILITER(S): 9 INJECTION, SOLUTION INTRAVENOUS at 09:42

## 2020-09-10 NOTE — DISCHARGE NOTE PROVIDER - CARE PROVIDER_API CALL
Benson Serrano  UROLOGY  [address] [place] [address], [place] [address]  Phone: [phone]  Fax: [phone]  Follow Up Time:

## 2020-09-10 NOTE — DISCHARGE NOTE PROVIDER - HOSPITAL COURSE
61 yo M underwent uncomplicated left lap partial nephrectomy on  .  Postoperative course uneventful, successful TOV on POD #1,  pain controlled, ambulating.  Tolerating CLD, no N/V. Pt d/c on POD #1 to self advance diet and to f/u with Dr. Serrano.    I-stop checked. 61 yo M underwent uncomplicated left lap partial nephrectomy on 9/9/2020.  Postoperative course uneventful, successful TOV on POD #1,  pain controlled, ambulating.  Tolerating CLD, no N/V. Pt d/c on POD #1 to self advance diet and to f/u with Dr. Serrano.    I-stop checked.

## 2020-09-10 NOTE — DISCHARGE NOTE NURSING/CASE MANAGEMENT/SOCIAL WORK - NSDCPNINST_GEN_ALL_CORE
Make a follow up appointment with Dr. Serrano. Call MD if you develop a fever, or if there is redness, swelling, drainage or pain not relieved by pain medication. No heavy lifting, bending, or straining to move your bowels. Take over the counter stool softeners as needed to prevent constipation which may be caused by pain medication. Drink plenty of liquids. Your A1C= 6.7. Continue to follow a consistent carbohydrate diet and take your medications for diabetes.

## 2020-09-10 NOTE — DISCHARGE NOTE PROVIDER - NSDCMRMEDTOKEN_GEN_ALL_CORE_FT
acetaminophen 325 mg oral tablet: 3 tablets every 6 hours as needed for mild to moderate pain  AcipHex 20 mg oral delayed release tablet: 1 tab(s) orally once a day  alfuzosin 10 mg oral tablet, extended release: 1 tab(s) orally once a day  atorvastatin 40 mg oral tablet: 1 tab(s) orally once a day  benzonatate 200 mg oral capsule: 1 cap(s) orally 3 times a day, As Needed  eplerenone 25 mg oral tablet: 1 tab(s) orally 2 times a day  metFORMIN 500 mg oral tablet: 1 tab(s) orally 2 times a day  oxyCODONE 5 mg oral tablet: 1 tablet every 6 hours as needed for severe pain MDD:4  senna oral tablet: 2 tab(s) orally once a day (at bedtime), As needed, Constipation

## 2020-09-10 NOTE — DISCHARGE NOTE PROVIDER - NSDCCPCAREPLAN_GEN_ALL_CORE_FT
PRINCIPAL DISCHARGE DIAGNOSIS  Diagnosis: Renal mass, left  Assessment and Plan of Treatment: Drink plenty of fluids, keep on a clear liquid diet until you pass gas more consistently, then advance your diet slowly as tolerated.  Eat small, frequent amounts, your appetite will take a while to return to normal.  No heavy lifting (greater than 10 pounds) or straining for 4 to 6 weeks.  You may shower, just pat white strips dry, they will fall off in a few weeks.   Do not drive when taking pain medication.  Call Dr. Serrano's office  to schedule a follow up appointment.  Call the office if you have fever greater than 101, difficulty urinating, pain not relieved with pain medication, nausea/vomiting.        SECONDARY DISCHARGE DIAGNOSES  Diagnosis: GERD (gastroesophageal reflux disease)  Assessment and Plan of Treatment: Continue current home medications and follow up with Dr. Rose      Diagnosis: Type 2 diabetes mellitus  Assessment and Plan of Treatment: Continue current home medications and follow up with Dr. Rose PRINCIPAL DISCHARGE DIAGNOSIS  Diagnosis: Renal mass, left  Assessment and Plan of Treatment: Drink plenty of fluids, keep on a clear liquid diet until you pass gas more consistently, then advance your diet slowly as tolerated.  Eat small, frequent amounts, your appetite will take a while to return to normal.  No heavy lifting (greater than 10 pounds) or straining for 4 to 6 weeks.  You may shower, just pat white strips dry, they will fall off in a few weeks.   Do not drive when taking pain medication.  Call Dr. Serrano's office  to schedule a follow up appointment.  Call the office if you have fever greater than 101, difficulty urinating, pain not relieved with pain medication, nausea/vomiting.        SECONDARY DISCHARGE DIAGNOSES  Diagnosis: HTN (hypertension)  Assessment and Plan of Treatment: Continue current home medications and follow up with Dr. Rose in 1 to 2 weeks for BP check and medication adjustment as indicated    Diagnosis: GERD (gastroesophageal reflux disease)  Assessment and Plan of Treatment: Continue current home medications and follow up with Dr. Rose      Diagnosis: Type 2 diabetes mellitus  Assessment and Plan of Treatment: Continue current home medications and follow up with Dr. Rose

## 2020-09-10 NOTE — DISCHARGE NOTE NURSING/CASE MANAGEMENT/SOCIAL WORK - PATIENT PORTAL LINK FT
You can access the FollowMyHealth Patient Portal offered by [facility] by registering at the following website: [email or web address]. By joining [facility] [employer]’s FollowMyHealth portal, you will also be able to view your health information using other applications (apps) compatible with our system.

## 2020-09-10 NOTE — PROGRESS NOTE ADULT - SUBJECTIVE AND OBJECTIVE BOX
CHIEF COMPLAINT: f/u     SUBJECTIVE / OVERNIGHT EVENTS: No acute events overnight. States he urinated after najera was dced. Denies chest pain, SOB, reports some cough this morning. Has not been using incentive spirometer much. Has not been passing flatus yet.    MEDICATIONS  (STANDING):  dextrose 5% + sodium chloride 0.45%. 1000 milliLiter(s) (75 mL/Hr) IV Continuous <Continuous>  dextrose 5%. 1000 milliLiter(s) (50 mL/Hr) IV Continuous <Continuous>  dextrose 50% Injectable 12.5 Gram(s) IV Push once  dextrose 50% Injectable 25 Gram(s) IV Push once  heparin   Injectable 5000 Unit(s) SubCutaneous every 8 hours  insulin lispro (HumaLOG) corrective regimen sliding scale   SubCutaneous three times a day before meals  pantoprazole    Tablet 40 milliGRAM(s) Oral before breakfast    MEDICATIONS  (PRN):  benzonatate 100 milliGRAM(s) Oral three times a day PRN Cough  dextrose 40% Gel 15 Gram(s) Oral once PRN Blood Glucose LESS THAN 70 milliGRAM(s)/deciliter  glucagon  Injectable 1 milliGRAM(s) IntraMuscular once PRN Glucose LESS THAN 70 milligrams/deciliter  ondansetron Injectable 4 milliGRAM(s) IV Push every 6 hours PRN Nausea and/or Vomiting  oxyCODONE    IR 5 milliGRAM(s) Oral every 6 hours PRN Moderate Pain (4 - 6)  oxyCODONE    IR 10 milliGRAM(s) Oral every 6 hours PRN Severe Pain (7 - 10)  senna 2 Tablet(s) Oral at bedtime PRN Constipation      VITALS:  T(F): 98.5 (09-10-20 @ 09:24), Max: 98.5 (09-10-20 @ 09:24)  HR: 89 (09-10-20 @ 09:24) (78 - 101)  BP: 108/62 (09-10-20 @ 09:24) (108/62 - 147/73)  RR: 18 (09-10-20 @ 09:24) (9 - 19)  SpO2: 94% (09-10-20 @ 09:24)  Wt(kg): --      CAPILLARY BLOOD GLUCOSE      PHYSICAL EXAM:  GENERAL: NAD, on room air, having dry cough  HEENT: sclera clear, eyes tracking  RESPIRATORY: Clear to auscultation bilaterally; No crackles or wheezing  CARDIOVASCULAR: s1/s2, RRR, no murmurs appreciated  GASTROINTESTINAL: Soft, nontender, slightly distended; Bowel sounds present, incision sites C/D/I  GENITOURINARY: no longer with najera  EXTREMITIES:  WWP, no edema  NERVOUS SYSTEM:  awake, alert, no gross deficits  PSYCH: calm, cooperative  SKIN: No rashes or lesions; Incisions C/D/I    LABS:              11.2                 137  | 25   | 18           9.68  >-----------< 254     ------------------------< 112                   34.4                 4.5  | 103  | 1.44                                         Ca 9.1   Mg x     Ph x                          MICROBIOLOGY:        RADIOLOGY & ADDITIONAL TESTS:  Imaging Personally Reviewed: [ ] Yes    [ ] Consultant(s) Notes Reviewed:  [x] Care Discussed with Consultants/Other Providers:
 Note    Post op Check    s/p :     Pt seen / examined without complaints pain controlled    Vital Signs Last 24 Hrs  T(C): 36.4 (09 Sep 2020 15:08), Max: 36.9 (09 Sep 2020 06:07)  T(F): 97.6 (09 Sep 2020 15:08), Max: 98.4 (09 Sep 2020 06:07)  HR: 99 (09 Sep 2020 15:08) (85 - 101)  BP: 140/69 (09 Sep 2020 15:08) (129/68 - 147/73)  BP(mean): 71 (09 Sep 2020 14:00) (71 - 91)  RR: 18 (09 Sep 2020 15:08) (9 - 22)  SpO2: 96% (09 Sep 2020 15:08) (92% - 100%)    I&O's Summary    09 Sep 2020 07:01  -  09 Sep 2020 15:46  --------------------------------------------------------  IN: 600 mL / OUT: 230 mL / NET: 370 mL        PHYSICAL EXAM:       Constitutional: awake alert NAD    Respiratory: no resp distress    Cardiovascular: RR    Gastrointestinal: softly distended, trocar sites CDI, appropriately tender    Genitourinary: najera in place, draining well, yellow    Extremities: + venodynes
Subjective:  Patient doing well this AM. Pain controlled. No nausea, no vomitting.     Objectives:  T(C): 36.9 (09-10-20 @ 05:27), Max: 36.9 (09-10-20 @ 05:27)  HR: 84 (09-10-20 @ 05:27) (78 - 97)  BP: 127/57 (09-10-20 @ 05:27) (111/56 - 127/57)  RR: 18 (09-10-20 @ 05:27) (16 - 18)  SpO2: 95% (09-10-20 @ 05:27) (95% - 96%)  Wt(kg): --    09-09 @ 07:01  -  09-10 @ 07:00  --------------------------------------------------------  IN:    lactated ringers.: 500 mL    Oral Fluid: 100 mL  Total IN: 600 mL    OUT:    Indwelling Catheter - Urethral: 2100 mL  Total OUT: 2100 mL    Total NET: -1500 mL      Physcial Exam  GENERAL: NAD, well-developed  ABDOMEN: Soft, Nontender, Nondistended;   Wound: Incisions, clean, dry, intact  PSYCH: AAOx3    LABS:          CAPILLARY BLOOD GLUCOSE      POCT Blood Glucose.: 126 mg/dL (09 Sep 2020 23:30)      CAPILLARY BLOOD GLUCOSE      POCT Blood Glucose.: 126 mg/dL (09 Sep 2020 23:30)

## 2020-09-10 NOTE — PROGRESS NOTE ADULT - ASSESSMENT
60 yr old man PMH HTN, GERD, BPH, nephrolithiasis s/p laser therapy in 2017, Type 2 DM on metformin, HLD presents for surgery after discovery of a left renal mass. Patient is now s/p left partial nephrectomy on 9/9 and with expected ALEJANDRA.    # left renal mass  - s/p left partial nephrectomy on 9/9, EBL 300cc  - now with postop ALEJANDRA that is expected after this type of surgery. Cr increase to 1.4 from 1.08  - also with some mild postop blood loss anemia. Hgb decrease noted from 13.1->11.2. Continue to monitor  - [place] dced and passed TOV. Has not passed flatus yet  - f/u surgical path  - postop management per urology  - pain management per primary team    # Type 2 DM  - A1C: 6.7  - goal -180  - on metformin at home. Can continue metformin 500mg BID  - consistent carb diet  - low correction SSI premeals for now while inpatient    # HTN  - goal SBP 130s. BP remains well controlled  - patient reports taking eplerenone for his glaucoma.   - After discussion at bedside with Dr. Serrano, patient to resume eplerenone on DC and was advised of close followup with his PCP within 1-2 weeks to recheck Cr.    # HLD  - continue lipitor    # GERD  - continue aciphex: on protonix inpatient as therapeutic interchange    # prophylaxis  OOB as tolerated  diet as tolerated  on HSQ for DVT ppx    DC planning per primary team
60M s/p L lap part nephx
Pt is a 59 yo male s/p left partial nephrectomy.  - AM labs  - OOB  - TOV  - CLD, ADAT  - Pain control  - Discharge today vs tomorrow.

## 2020-09-15 LAB — SURGICAL PATHOLOGY STUDY: SIGNIFICANT CHANGE UP

## 2020-09-18 PROBLEM — N20.0 CALCULUS OF KIDNEY: Chronic | Status: ACTIVE | Noted: 2020-09-04

## 2020-09-18 PROBLEM — Z87.19 PERSONAL HISTORY OF OTHER DISEASES OF THE DIGESTIVE SYSTEM: Chronic | Status: ACTIVE | Noted: 2020-09-04

## 2020-09-18 PROBLEM — E11.9 TYPE 2 DIABETES MELLITUS WITHOUT COMPLICATIONS: Chronic | Status: ACTIVE | Noted: 2020-09-04

## 2020-09-18 PROBLEM — N40.0 BENIGN PROSTATIC HYPERPLASIA WITHOUT LOWER URINARY TRACT SYMPTOMS: Chronic | Status: ACTIVE | Noted: 2020-09-04

## 2020-09-30 ENCOUNTER — APPOINTMENT (OUTPATIENT)
Dept: UROLOGY | Facility: CLINIC | Age: 60
End: 2020-09-30
Payer: COMMERCIAL

## 2020-09-30 PROCEDURE — 99024 POSTOP FOLLOW-UP VISIT: CPT

## 2020-09-30 NOTE — PHYSICAL EXAM
[General Appearance - Well Developed] : well developed [General Appearance - Well Nourished] : well nourished [Abdomen Soft] : soft [FreeTextEntry1] : wounds healing well [Skin Color & Pigmentation] : normal skin color and pigmentation [Heart Rate And Rhythm] : Heart rate and rhythm were normal [] : no respiratory distress [Oriented To Time, Place, And Person] : oriented to person, place, and time [Normal Station and Gait] : the gait and station were normal for the patient's age [No Focal Deficits] : no focal deficits

## 2020-09-30 NOTE — ASSESSMENT
[FreeTextEntry1] : He is 3 weeks post op .He is still having daily afternoon fatigue . His GI function has returned but his appetite is still down . Reassured this will improve over the next week or so . He has mild pain over the left trocar site . There is no hernia appreciated . His incisions are healing with no evidence of infection .\par Recommend hydration , no added salt and no excessive protein diet . Reinforced lifting restrictions . He can do aerobic exercises .\par We reviewed the pathology and the need for yearly imaging

## 2021-04-06 ENCOUNTER — OUTPATIENT (OUTPATIENT)
Dept: OUTPATIENT SERVICES | Facility: HOSPITAL | Age: 61
LOS: 1 days | End: 2021-04-06
Payer: COMMERCIAL

## 2021-04-06 ENCOUNTER — APPOINTMENT (OUTPATIENT)
Dept: UROLOGY | Facility: CLINIC | Age: 61
End: 2021-04-06
Payer: COMMERCIAL

## 2021-04-06 DIAGNOSIS — R35.0 FREQUENCY OF MICTURITION: ICD-10-CM

## 2021-04-06 DIAGNOSIS — R33.9 RETENTION OF URINE, UNSPECIFIED: ICD-10-CM

## 2021-04-06 DIAGNOSIS — Z98.890 OTHER SPECIFIED POSTPROCEDURAL STATES: Chronic | ICD-10-CM

## 2021-04-06 PROCEDURE — 99214 OFFICE O/P EST MOD 30 MIN: CPT

## 2021-04-06 PROCEDURE — 76775 US EXAM ABDO BACK WALL LIM: CPT | Mod: 26

## 2021-04-06 PROCEDURE — 99072 ADDL SUPL MATRL&STAF TM PHE: CPT

## 2021-04-06 PROCEDURE — 76775 US EXAM ABDO BACK WALL LIM: CPT

## 2021-04-10 DIAGNOSIS — C64.9 MALIGNANT NEOPLASM OF UNSPECIFIED KIDNEY, EXCEPT RENAL PELVIS: ICD-10-CM

## 2021-04-10 DIAGNOSIS — R33.9 RETENTION OF URINE, UNSPECIFIED: ICD-10-CM

## 2021-04-12 ENCOUNTER — APPOINTMENT (OUTPATIENT)
Dept: CT IMAGING | Facility: IMAGING CENTER | Age: 61
End: 2021-04-12

## 2021-04-26 ENCOUNTER — OUTPATIENT (OUTPATIENT)
Dept: OUTPATIENT SERVICES | Facility: HOSPITAL | Age: 61
LOS: 1 days | End: 2021-04-26
Payer: COMMERCIAL

## 2021-04-26 ENCOUNTER — APPOINTMENT (OUTPATIENT)
Dept: CT IMAGING | Facility: IMAGING CENTER | Age: 61
End: 2021-04-26
Payer: COMMERCIAL

## 2021-04-26 DIAGNOSIS — Z00.8 ENCOUNTER FOR OTHER GENERAL EXAMINATION: ICD-10-CM

## 2021-04-26 DIAGNOSIS — C64.9 MALIGNANT NEOPLASM OF UNSPECIFIED KIDNEY, EXCEPT RENAL PELVIS: ICD-10-CM

## 2021-04-26 DIAGNOSIS — Z98.890 OTHER SPECIFIED POSTPROCEDURAL STATES: Chronic | ICD-10-CM

## 2021-04-26 PROCEDURE — 71250 CT THORAX DX C-: CPT | Mod: 26

## 2021-04-26 PROCEDURE — 71250 CT THORAX DX C-: CPT

## 2021-10-21 ENCOUNTER — RX RENEWAL (OUTPATIENT)
Age: 61
End: 2021-10-21

## 2021-11-03 ENCOUNTER — NON-APPOINTMENT (OUTPATIENT)
Age: 61
End: 2021-11-03

## 2022-01-04 ENCOUNTER — APPOINTMENT (OUTPATIENT)
Dept: UROLOGY | Facility: CLINIC | Age: 62
End: 2022-01-04
Payer: COMMERCIAL

## 2022-01-04 VITALS
HEIGHT: 72 IN | WEIGHT: 190 LBS | RESPIRATION RATE: 17 BRPM | HEART RATE: 92 BPM | SYSTOLIC BLOOD PRESSURE: 144 MMHG | DIASTOLIC BLOOD PRESSURE: 83 MMHG | BODY MASS INDEX: 25.73 KG/M2 | TEMPERATURE: 98.4 F

## 2022-01-04 PROCEDURE — 99214 OFFICE O/P EST MOD 30 MIN: CPT

## 2022-01-04 RX ORDER — SILDENAFIL 100 MG/1
100 TABLET, FILM COATED ORAL
Qty: 10 | Refills: 6 | Status: DISCONTINUED | COMMUNITY
Start: 2020-07-13 | End: 2022-01-04

## 2022-01-04 RX ORDER — CLINDAMYCIN PHOSPHATE 1 G/10ML
1 GEL TOPICAL
Qty: 60 | Refills: 0 | Status: ACTIVE | COMMUNITY
Start: 2021-07-19

## 2022-01-04 NOTE — ASSESSMENT
[FreeTextEntry1] : Imaging in April no evidence of recurrence but a 1.3 parapelvic cyst in the left mid kidney . Stable chest CT \par He repeated the PSA in November 2.3 \par He has nocturia x 1 No dysuria , frequency or hematuria \par Follow up in April for imaging

## 2022-01-04 NOTE — PHYSICAL EXAM
[General Appearance - Well Developed] : well developed [General Appearance - Well Nourished] : well nourished [Abdomen Soft] : soft [Heart Rate And Rhythm] : Heart rate and rhythm were normal [] : no respiratory distress [Oriented To Time, Place, And Person] : oriented to person, place, and time [Normal Station and Gait] : the gait and station were normal for the patient's age [No Palpable Adenopathy] : no palpable adenopathy

## 2022-01-04 NOTE — HISTORY OF PRESENT ILLNESS
[Nocturia] : nocturia [FreeTextEntry1] : Left partial nephrectomy  2020 [facility] pT1 b FG 2.\par In April his PSA was 4.2  Repeat was 2.9  The first PSA he had not abstained from ejaculation

## 2022-04-05 ENCOUNTER — APPOINTMENT (OUTPATIENT)
Dept: UROLOGY | Facility: CLINIC | Age: 62
End: 2022-04-05
Payer: COMMERCIAL

## 2022-04-05 ENCOUNTER — OUTPATIENT (OUTPATIENT)
Dept: OUTPATIENT SERVICES | Facility: HOSPITAL | Age: 62
LOS: 1 days | End: 2022-04-05
Payer: COMMERCIAL

## 2022-04-05 VITALS
RESPIRATION RATE: 17 BRPM | HEIGHT: 72 IN | WEIGHT: 210 LBS | DIASTOLIC BLOOD PRESSURE: 79 MMHG | TEMPERATURE: 98.4 F | HEART RATE: 108 BPM | SYSTOLIC BLOOD PRESSURE: 153 MMHG | BODY MASS INDEX: 28.44 KG/M2

## 2022-04-05 DIAGNOSIS — R35.0 FREQUENCY OF MICTURITION: ICD-10-CM

## 2022-04-05 DIAGNOSIS — Z98.890 OTHER SPECIFIED POSTPROCEDURAL STATES: Chronic | ICD-10-CM

## 2022-04-05 PROCEDURE — 76775 US EXAM ABDO BACK WALL LIM: CPT | Mod: 26

## 2022-04-05 PROCEDURE — 99213 OFFICE O/P EST LOW 20 MIN: CPT

## 2022-04-05 PROCEDURE — 76775 US EXAM ABDO BACK WALL LIM: CPT

## 2022-04-05 NOTE — PHYSICAL EXAM
[General Appearance - Well Developed] : well developed [General Appearance - Well Nourished] : well nourished [Normal Appearance] : normal appearance [Well Groomed] : well groomed [General Appearance - In No Acute Distress] : no acute distress [Abdomen Soft] : soft [Abdomen Tenderness] : non-tender [Costovertebral Angle Tenderness] : no ~M costovertebral angle tenderness [Urethral Meatus] : meatus normal [Urinary Bladder Findings] : the bladder was normal on palpation [Scrotum] : the scrotum was normal [Testes Mass (___cm)] : there were no testicular masses [No Prostate Nodules] : no prostate nodules [Prostate Size ___ gm] : prostate size [unfilled] gm [Edema] : no peripheral edema [] : no respiratory distress [Respiration, Rhythm And Depth] : normal respiratory rhythm and effort [Exaggerated Use Of Accessory Muscles For Inspiration] : no accessory muscle use

## 2022-04-05 NOTE — ASSESSMENT
[FreeTextEntry1] : 63 y/o s/p L partial 9/9/20. REGIS on US today. BPH/OAB.\par - US kidney and chest CT in one year\par - start Detrol and stop alfuzosin\par - continue Tadalafil prn

## 2022-04-05 NOTE — HISTORY OF PRESENT ILLNESS
[FreeTextEntry1] : 62 year old s/p L partial 9/9/20 \par \par Path: 4.5cm clear cell pT1b G2 \par \par CT chest 4/2021: stable subcm nodules \par US today no evidence of disease\par \par PSA: 4.2, repeat 2.9 in April. Patient repeated in November 2.3.\par Patient takes alfuzosin for nocturia, but reports 2-3x night and daytime frequency. Abstains from fluids >4 hours prior to bed.\par \par PVR today 7\par \par Tadalafil prn for ED, has only tried once. Headaches in past with Sildenafil.\par

## 2022-04-08 DIAGNOSIS — C64.9 MALIGNANT NEOPLASM OF UNSPECIFIED KIDNEY, EXCEPT RENAL PELVIS: ICD-10-CM

## 2022-04-28 ENCOUNTER — OUTPATIENT (OUTPATIENT)
Dept: OUTPATIENT SERVICES | Facility: HOSPITAL | Age: 62
LOS: 1 days | End: 2022-04-28
Payer: COMMERCIAL

## 2022-04-28 ENCOUNTER — APPOINTMENT (OUTPATIENT)
Dept: CT IMAGING | Facility: IMAGING CENTER | Age: 62
End: 2022-04-28
Payer: COMMERCIAL

## 2022-04-28 DIAGNOSIS — Z98.890 OTHER SPECIFIED POSTPROCEDURAL STATES: Chronic | ICD-10-CM

## 2022-04-28 DIAGNOSIS — C64.9 MALIGNANT NEOPLASM OF UNSPECIFIED KIDNEY, EXCEPT RENAL PELVIS: ICD-10-CM

## 2022-04-28 DIAGNOSIS — Z00.8 ENCOUNTER FOR OTHER GENERAL EXAMINATION: ICD-10-CM

## 2022-04-28 PROCEDURE — 71250 CT THORAX DX C-: CPT | Mod: 26

## 2022-04-28 PROCEDURE — 71250 CT THORAX DX C-: CPT

## 2022-04-29 ENCOUNTER — TRANSCRIPTION ENCOUNTER (OUTPATIENT)
Age: 62
End: 2022-04-29

## 2022-06-15 ENCOUNTER — NON-APPOINTMENT (OUTPATIENT)
Age: 62
End: 2022-06-15

## 2022-06-15 ENCOUNTER — OUTPATIENT (OUTPATIENT)
Dept: OUTPATIENT SERVICES | Facility: HOSPITAL | Age: 62
LOS: 1 days | End: 2022-06-15
Payer: COMMERCIAL

## 2022-06-15 ENCOUNTER — APPOINTMENT (OUTPATIENT)
Dept: NEUROSURGERY | Facility: CLINIC | Age: 62
End: 2022-06-15
Payer: COMMERCIAL

## 2022-06-15 ENCOUNTER — APPOINTMENT (OUTPATIENT)
Dept: SPINE | Facility: CLINIC | Age: 62
End: 2022-06-15

## 2022-06-15 ENCOUNTER — RESULT REVIEW (OUTPATIENT)
Age: 62
End: 2022-06-15

## 2022-06-15 VITALS
BODY MASS INDEX: 29.12 KG/M2 | OXYGEN SATURATION: 97 % | DIASTOLIC BLOOD PRESSURE: 77 MMHG | RESPIRATION RATE: 18 BRPM | TEMPERATURE: 98 F | SYSTOLIC BLOOD PRESSURE: 130 MMHG | HEIGHT: 72 IN | WEIGHT: 215 LBS | HEART RATE: 91 BPM

## 2022-06-15 DIAGNOSIS — Z98.890 OTHER SPECIFIED POSTPROCEDURAL STATES: Chronic | ICD-10-CM

## 2022-06-15 DIAGNOSIS — M54.50 LOW BACK PAIN, UNSPECIFIED: ICD-10-CM

## 2022-06-15 DIAGNOSIS — M54.16 RADICULOPATHY, LUMBAR REGION: ICD-10-CM

## 2022-06-15 PROCEDURE — 99202 OFFICE O/P NEW SF 15 MIN: CPT

## 2022-06-15 PROCEDURE — 72110 X-RAY EXAM L-2 SPINE 4/>VWS: CPT

## 2022-06-15 PROCEDURE — 72110 X-RAY EXAM L-2 SPINE 4/>VWS: CPT | Mod: 26

## 2022-06-15 NOTE — ASSESSMENT
[FreeTextEntry1] : Patient is presenting with low back pain with radicular pain radiating to LEFT foot which could be due to degenerative spinal stenosis. Due to history of malignancy, will obtain MRI lumbar spine with and without contrast and rule out nerve compression\par Will also obtain flexion extension x-rays to evaluate for lumbar instability\par Recommend physical therapy 2-3 times per week for gait training, balance and coordination and muscular strengthening\par \par After MRI lumbar spine complete, return to the office to review imaging.\par All questions answered to patient's satisfaction\par Educated regarding RED FLAG symptoms including progressively worsening weakness, imbalance, urinary/bowel incontinence, notify office or report to ED\par \par Patient verbalizes agreement and understanding with plan of care.\par

## 2022-06-15 NOTE — HISTORY OF PRESENT ILLNESS
[de-identified] : 62 year old man with past medical history DM, renal cell carcinoma (s/p left partial nephrectomy 9/9/20) who presents today reporting worsening low back pain radiating to LEFT leg and LEFT foot. He reports he has had the pain for several years but has worsened in severity over the past few months. \par \par He states symptoms are exacerbated by prolonged standing. He states he has performed exercises throughout the years with no relief of symptoms. He does take Advil occasionally with no relief. He endorses intermittent numbness/tingling to LEFT lower leg and foot. Endorses subjective left leg weakness. Denies urinary/bowel incontinence. Reports occasional imbalance. \par \par He saw his PCP for symptoms and lumbar x-ray was done and he was referred for neurosurgical evaluation.

## 2022-06-15 NOTE — PHYSICAL EXAM
[General Appearance - Alert] : alert [General Appearance - In No Acute Distress] : in no acute distress [Oriented To Time, Place, And Person] : oriented to person, place, and time [Motor Tone] : muscle tone was normal in all four extremities [Motor Strength] : muscle strength was normal in all four extremities [5] : S1 ankle flexors 5/5 [No Visual Abnormalities] : no visible abnormailities [Straight-Leg Raise Test - Left] : straight leg raise of the left leg was positive [Antalgic] : antalgic [Able to toe walk] : the patient was able to toe walk [Able to heel walk] : the patient was able to heel walk [Sclera] : the sclera and conjunctiva were normal [Outer Ear] : the ears and nose were normal in appearance [] : no respiratory distress [Abnormal Walk] : normal gait [Skin Color & Pigmentation] : normal skin color and pigmentation

## 2022-06-20 ENCOUNTER — NON-APPOINTMENT (OUTPATIENT)
Age: 62
End: 2022-06-20

## 2022-07-05 NOTE — BRIEF OPERATIVE NOTE - BRIEF OP NOTE DRAINS
Copy will be scanned in chart.              
Next appointment with Dr Auguste: 8/16/2022  Last appointment with Berenice: 2/17/2022    Last labs: 7/30/2021  
Requested most recent labs from Dr Bosler (Dec 2021)  
16 fr najera

## 2022-07-06 ENCOUNTER — APPOINTMENT (OUTPATIENT)
Dept: MRI IMAGING | Facility: CLINIC | Age: 62
End: 2022-07-06

## 2022-07-06 ENCOUNTER — OUTPATIENT (OUTPATIENT)
Dept: OUTPATIENT SERVICES | Facility: HOSPITAL | Age: 62
LOS: 1 days | End: 2022-07-06

## 2022-07-06 DIAGNOSIS — Z98.890 OTHER SPECIFIED POSTPROCEDURAL STATES: Chronic | ICD-10-CM

## 2022-07-06 PROCEDURE — 72158 MRI LUMBAR SPINE W/O & W/DYE: CPT | Mod: 26

## 2022-07-06 NOTE — H&P PST ADULT - EXTREMITIES
Admission Reconciliation is Not Complete  Discharge Reconciliation is Not Complete Admission Reconciliation is Completed  Discharge Reconciliation is Not Complete Admission Reconciliation is Completed  Discharge Reconciliation is Completed detailed exam

## 2022-07-12 RX ORDER — TOLTERODINE TARTRATE 2 MG/1
2 CAPSULE, EXTENDED RELEASE ORAL
Qty: 30 | Refills: 6 | Status: DISCONTINUED | COMMUNITY
Start: 2022-04-05 | End: 2022-07-12

## 2022-07-12 RX ORDER — TOLTERODINE TARTRATE 2 MG/1
2 CAPSULE, EXTENDED RELEASE ORAL
Qty: 90 | Refills: 4 | Status: DISCONTINUED | COMMUNITY
Start: 2022-04-26 | End: 2022-07-12

## 2022-07-13 ENCOUNTER — NON-APPOINTMENT (OUTPATIENT)
Age: 62
End: 2022-07-13

## 2022-07-14 ENCOUNTER — NON-APPOINTMENT (OUTPATIENT)
Age: 62
End: 2022-07-14

## 2022-10-20 ENCOUNTER — APPOINTMENT (OUTPATIENT)
Dept: UROLOGY | Facility: CLINIC | Age: 62
End: 2022-10-20

## 2022-10-20 VITALS
TEMPERATURE: 95.9 F | OXYGEN SATURATION: 98 % | DIASTOLIC BLOOD PRESSURE: 83 MMHG | RESPIRATION RATE: 18 BRPM | SYSTOLIC BLOOD PRESSURE: 161 MMHG | HEART RATE: 85 BPM

## 2022-10-20 VITALS
WEIGHT: 215 LBS | OXYGEN SATURATION: 98 % | DIASTOLIC BLOOD PRESSURE: 83 MMHG | HEART RATE: 85 BPM | RESPIRATION RATE: 18 BRPM | HEIGHT: 72 IN | SYSTOLIC BLOOD PRESSURE: 161 MMHG | BODY MASS INDEX: 29.12 KG/M2

## 2022-10-20 DIAGNOSIS — N53.19 OTHER EJACULATORY DYSFUNCTION: ICD-10-CM

## 2022-10-20 PROCEDURE — 54235 NJX CORPORA CAVERNOSA RX AGT: CPT

## 2022-10-20 PROCEDURE — 99214 OFFICE O/P EST MOD 30 MIN: CPT | Mod: 25

## 2022-10-20 PROCEDURE — 93980 PENILE VASCULAR STUDY: CPT

## 2022-10-20 NOTE — ASSESSMENT
[FreeTextEntry1] : ED\par - PDUS - will be dictated separately, found to have venous leak\par - c/w Cialis\par - Trimix - discussed using 30 units at home but he wants to try cialis 5 mg first as he is able to achieve some erection \par - if not penetratable then will bring back for ICI \par - ICI teaching performed, however pt seems reluctant to start ICI at this time\par \par Regarding his and ejaculation this is due to combination of BPH, age and alfuzosin.\par \par I had long discussed with the patient that this per se is not any medical disease.  It will not affect him negatively in any way.  He is able to achieve orgasm during sexual intercourse.  Standard treatment of an ejaculation at this age includes focus on improving the quality of erections and arousal.  If he is not able to achieve adequate erection with oral medication then we will discuss using injection therapy and using potentially low-dose of pramipexole.  He will see me as needed.  He will follow-up with Dr. Serrano as well \par \par The submitted E/M billing level for this visit reflects the total time spent on the day of the visit including documentation in EMR, face-to-face time spent with the patient, non-face-to-face review of medical records and relevant information, review of laboratory results available via [facility] portal, as well using a patient’s electronic medical records portal for patients with records not available to [facility] directly. \par \par Time spend counseling and performing coordination of care was also included in determining the appropriate EM billing level.\par \par I have reviewed and verified information regarding the chief complaint and history recorded by the ancillary staff and/or the patient. I have independently reviewed and interpreted tests performed by other physicians and facilities as necessary. I have reviewed images pertinent to providing the care to  the patient. \par \par Notes from other physicians were reviewed. \par \par \par I have reviewed with the patient previously ordered laboratory tests, discussed changes in levels, their meaning and consequences.\par \par I have discussed with the patient differential diagnosis, reason for auxiliary tests if ordered, risks, benefits, alternatives, and complications of each form of therapy included surgical therapy. Off label use of most of medications used in andrology was reviewed. \par \par Additional labs and imaging studies were ordered. \par \par All questions were answered. \par \par \par Total time spent 31 min.\par This includes time spent face to face discussing risk, complications, and benefits of each of the therapies and triggers to return to office.\par Discussed goals of therapy, realistic expectations, and alternatives.\par Reviewing pertinent notes, lab results, imaging studies, coordinating care, entering orders and preparing note.\par

## 2022-10-20 NOTE — PHYSICAL EXAM
[General Appearance - Well Developed] : well developed [General Appearance - Well Nourished] : well nourished [Normal Appearance] : normal appearance [Well Groomed] : well groomed [General Appearance - In No Acute Distress] : no acute distress [Abdomen Soft] : soft [Abdomen Tenderness] : non-tender [Costovertebral Angle Tenderness] : no ~M costovertebral angle tenderness [Urethral Meatus] : meatus normal [Urinary Bladder Findings] : the bladder was normal on palpation [Scrotum] : the scrotum was normal [Edema] : no peripheral edema [] : no respiratory distress [Respiration, Rhythm And Depth] : normal respiratory rhythm and effort [Exaggerated Use Of Accessory Muscles For Inspiration] : no accessory muscle use [Oriented To Time, Place, And Person] : oriented to person, place, and time [Affect] : the affect was normal [Mood] : the mood was normal [Not Anxious] : not anxious [Normal Station and Gait] : the gait and station were normal for the patient's age [No Focal Deficits] : no focal deficits [No Palpable Adenopathy] : no palpable adenopathy [FreeTextEntry1] : no penile plaques palpated

## 2022-10-20 NOTE — HISTORY OF PRESENT ILLNESS
[FreeTextEntry1] : 62M w s/p L partial 9/9/20 for clear cell T1b, G2. \par \par Referred to us for ED. Pt currently taking Cialis. Reports having moderate erection occasional for penetration. \par \par C/o anejaculation. \par \par Presents for PDUS.

## 2023-01-23 ENCOUNTER — APPOINTMENT (OUTPATIENT)
Dept: UROLOGY | Facility: CLINIC | Age: 63
End: 2023-01-23

## 2023-03-14 DIAGNOSIS — N52.01 ERECTILE DYSFUNCTION DUE TO ARTERIAL INSUFFICIENCY: ICD-10-CM

## 2023-03-16 ENCOUNTER — APPOINTMENT (OUTPATIENT)
Dept: UROLOGY | Facility: CLINIC | Age: 63
End: 2023-03-16
Payer: COMMERCIAL

## 2023-03-16 VITALS
WEIGHT: 210 LBS | HEIGHT: 72 IN | DIASTOLIC BLOOD PRESSURE: 70 MMHG | HEART RATE: 90 BPM | BODY MASS INDEX: 28.44 KG/M2 | SYSTOLIC BLOOD PRESSURE: 134 MMHG

## 2023-03-16 LAB
BASOPHILS # BLD AUTO: 0.06 K/UL
BASOPHILS NFR BLD AUTO: 0.9 %
EOSINOPHIL # BLD AUTO: 0.22 K/UL
EOSINOPHIL NFR BLD AUTO: 3.2 %
HCT VFR BLD CALC: 37.7 %
HGB BLD-MCNC: 12.3 G/DL
IMM GRANULOCYTES NFR BLD AUTO: 0.3 %
LYMPHOCYTES # BLD AUTO: 1.33 K/UL
LYMPHOCYTES NFR BLD AUTO: 19.1 %
MAN DIFF?: NORMAL
MCHC RBC-ENTMCNC: 29.3 PG
MCHC RBC-ENTMCNC: 32.6 GM/DL
MCV RBC AUTO: 89.8 FL
MONOCYTES # BLD AUTO: 0.51 K/UL
MONOCYTES NFR BLD AUTO: 7.3 %
NEUTROPHILS # BLD AUTO: 4.84 K/UL
NEUTROPHILS NFR BLD AUTO: 69.2 %
PLATELET # BLD AUTO: 312 K/UL
RBC # BLD: 4.2 M/UL
RBC # FLD: 13.2 %
WBC # FLD AUTO: 6.98 K/UL

## 2023-03-16 PROCEDURE — 99215 OFFICE O/P EST HI 40 MIN: CPT

## 2023-03-23 LAB
25(OH)D3 SERPL-MCNC: 39.8 NG/ML
ALBUMIN SERPL ELPH-MCNC: 4.6 G/DL
ALP BLD-CCNC: 98 U/L
ALT SERPL-CCNC: 31 U/L
ANION GAP SERPL CALC-SCNC: 14 MMOL/L
APO LP(A) SERPL-MCNC: 192.2 NMOL/L
APPEARANCE: CLEAR
AST SERPL-CCNC: 26 U/L
BILIRUB SERPL-MCNC: 0.4 MG/DL
BILIRUBIN URINE: NEGATIVE
BLOOD URINE: NEGATIVE
BUN SERPL-MCNC: 16 MG/DL
CALCIUM SERPL-MCNC: 10.3 MG/DL
CHLORIDE SERPL-SCNC: 102 MMOL/L
CHOLEST SERPL-MCNC: 186 MG/DL
CO2 SERPL-SCNC: 23 MMOL/L
COLOR: NORMAL
CREAT SERPL-MCNC: 1.12 MG/DL
CRP SERPL HS-MCNC: 1.02 MG/L
EGFR: 75 ML/MIN/1.73M2
ESTIMATED AVERAGE GLUCOSE: 143 MG/DL
ESTRADIOL SERPL-MCNC: 23 PG/ML
GLUCOSE QUALITATIVE U: NEGATIVE
GLUCOSE SERPL-MCNC: 191 MG/DL
HBA1C MFR BLD HPLC: 6.6 %
HDLC SERPL-MCNC: 66 MG/DL
KETONES URINE: NEGATIVE
LDLC SERPL CALC-MCNC: 76 MG/DL
LEUKOCYTE ESTERASE URINE: NEGATIVE
LH SERPL-ACNC: 4.9 IU/L
NITRITE URINE: NEGATIVE
NONHDLC SERPL-MCNC: 120 MG/DL
PH URINE: 6
POTASSIUM SERPL-SCNC: 4.4 MMOL/L
PROLACTIN SERPL-MCNC: 9.6 NG/ML
PROT SERPL-MCNC: 7.2 G/DL
PROTEIN URINE: NORMAL
PSA SERPL-MCNC: 3.93 NG/ML
SODIUM SERPL-SCNC: 139 MMOL/L
SPECIFIC GRAVITY URINE: 1.01
TESTOST FREE SERPL-MCNC: 7.2 PG/ML
TESTOST SERPL-MCNC: 237 NG/DL
TRIGL SERPL-MCNC: 219 MG/DL
TSH SERPL-ACNC: 1.15 UIU/ML
UROBILINOGEN URINE: NORMAL

## 2023-03-28 ENCOUNTER — APPOINTMENT (OUTPATIENT)
Dept: UROLOGY | Facility: CLINIC | Age: 63
End: 2023-03-28
Payer: COMMERCIAL

## 2023-03-28 ENCOUNTER — OUTPATIENT (OUTPATIENT)
Dept: OUTPATIENT SERVICES | Facility: HOSPITAL | Age: 63
LOS: 1 days | End: 2023-03-28
Payer: COMMERCIAL

## 2023-03-28 DIAGNOSIS — Z98.890 OTHER SPECIFIED POSTPROCEDURAL STATES: Chronic | ICD-10-CM

## 2023-03-28 DIAGNOSIS — R35.0 FREQUENCY OF MICTURITION: ICD-10-CM

## 2023-03-28 PROCEDURE — 99214 OFFICE O/P EST MOD 30 MIN: CPT

## 2023-03-28 PROCEDURE — 76775 US EXAM ABDO BACK WALL LIM: CPT | Mod: 26

## 2023-03-28 PROCEDURE — 76775 US EXAM ABDO BACK WALL LIM: CPT

## 2023-03-29 DIAGNOSIS — C64.9 MALIGNANT NEOPLASM OF UNSPECIFIED KIDNEY, EXCEPT RENAL PELVIS: ICD-10-CM

## 2023-04-04 ENCOUNTER — APPOINTMENT (OUTPATIENT)
Dept: UROLOGY | Facility: CLINIC | Age: 63
End: 2023-04-04
Payer: COMMERCIAL

## 2023-04-04 PROCEDURE — 99214 OFFICE O/P EST MOD 30 MIN: CPT | Mod: 95

## 2023-04-04 NOTE — ASSESSMENT
[FreeTextEntry1] : The patient returns for follow-up to review his recent blood studies and discuss options for therapy.  He is scheduled for a penile duplex ultrasound in about a month.\par \par His urinalysis was negative.  His hematocrit was 37.7% with a hemoglobin of 12.3 g/dL and medical evaluation was suggested.  His hemoglobin A1c was 6.6% and medical evaluation was suggested.  He is free testosterone was 7.2 pg/mL with a total testosterone of 237 ng/dL, PSA 3.93 ng/mL, prolactin 9.6 ng/L, TSH 1.15 µIU/L, estradiol 23 pg/mL, vitamin D 25 was 39.8 ng/mL, LH 4.9 IU/L, CRP 1.02 mg.  Lipoprotein a was 192.2 nmol/L admitted evaluation was suggested.  I have asked him to reach out to his medical doctor to discuss his anemia, lipoprotein a level.  He will follow-up with Dr. Serrano about his PSA of 3.93 ng/mL.\par \par I will see him back shortly for a penile duplex ultrasound and diagnostic injection.  He will continue with his Trimix injections and use of 20 mg of tadalafil.\par \par Telehealth Consultation: 30 minutes  10 minutes reviewing his history and discussing prior results.  20 minutes discussing various treatment options,  and writing his note. There was also additional time in preparing for the visit and assisting the patient with technology issues he was having with the telehealth platform.\par

## 2023-04-04 NOTE — HISTORY OF PRESENT ILLNESS
[FreeTextEntry1] : The patient-doctor. relationship has been established in a face-to-face fashion on real-time video audio HIPAA compliant communication using telemedicine software. The patient was at home and the physician was in his office. The patient's identity has been confirmed.  The patient was previously emailed a copy of the telemedicine consent.  The patient has had a chance to review and has now given verbal consent and has requested care to be assessed and treated through telemedicine. The patient understands there may be limitations in this process and that they need not need further follow-up care in the office and/or hospital settings. We were unable to use the [employer] platform and an alternative platform was utilized.  The patient was at home and I was in the office.\par \par Verbal consent was given on Tuesday, April 4, 2023 at 9 AM by the patient.  It was requested by the physician.  A written consent was previously sent for the patient to sign and return.\par \par The patient returns for follow-up to review his recent blood studies and discuss options for therapy.  He is scheduled for a penile duplex ultrasound in about a month.\par \par PMH: Patient is a 63-year-old gentleman who presents with a chief complaint of erectile dysfunction.  He had a recent penile duplex ultrasound that suggested arteriogenic dysfunction. He uses high dose trimix and 20 mg of Cialis for relations.  He states that this has been present for the past 2 years. It causes both he and his partner great stress.  I reviewed the questionnaire he completed in detail. His erections are not modified with the degree of sexual stimulation.   He states that his erections presently are often less than 0 out of 10 in both tumescence and rigidity, insufficient for penetration.   He often ejaculates through a flaccid phallus.  He has difficulty maintaining an erection. He describes a normal libido.  His sexual dysfunction occurs with both sexual relations and masturbation.  His erections are not improved with PDE5 inhibitors.    His partner is understanding and was unable to be with him at the visit today. He is not  and has adult children.  \par \par The patient denies fevers, chills, nausea and/or vomiting and no unexplained weight loss.  His past medical history is non-contributory.  In his present occupation he has no known toxin exposure. He does not smoke and drinks socially.  He has no known drug allergies. His review of systems and past medical history demonstrates no significant urologic issues (see patient completed questionnaire). His family history demonstrates no significant urologic issues.

## 2023-05-06 PROBLEM — N52.9 MALE ERECTILE DISORDER OF ORGANIC ORIGIN: Status: ACTIVE | Noted: 2023-03-14

## 2023-05-09 ENCOUNTER — APPOINTMENT (OUTPATIENT)
Dept: UROLOGY | Facility: CLINIC | Age: 63
End: 2023-05-09
Payer: COMMERCIAL

## 2023-05-09 ENCOUNTER — OUTPATIENT (OUTPATIENT)
Dept: OUTPATIENT SERVICES | Facility: HOSPITAL | Age: 63
LOS: 1 days | End: 2023-05-09
Payer: COMMERCIAL

## 2023-05-09 DIAGNOSIS — N52.9 MALE ERECTILE DYSFUNCTION, UNSPECIFIED: ICD-10-CM

## 2023-05-09 DIAGNOSIS — Z98.890 OTHER SPECIFIED POSTPROCEDURAL STATES: Chronic | ICD-10-CM

## 2023-05-09 DIAGNOSIS — R35.0 FREQUENCY OF MICTURITION: ICD-10-CM

## 2023-05-09 PROCEDURE — 54235 NJX CORPORA CAVERNOSA RX AGT: CPT

## 2023-05-09 PROCEDURE — 93980 PENILE VASCULAR STUDY: CPT

## 2023-05-09 PROCEDURE — 99214 OFFICE O/P EST MOD 30 MIN: CPT | Mod: 25

## 2023-05-09 PROCEDURE — 93980 PENILE VASCULAR STUDY: CPT | Mod: 26

## 2023-05-09 NOTE — ASSESSMENT
[FreeTextEntry1] : The patient returns for follow-up to discuss options for therapy.  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 Medical evaluation has been recommended. He will be injecting 0.2 to 0.4 cc of the trimix. I will discuss the efficacy in 2 weeks.\par \par His urinalysis was negative.  His hematocrit was 37.7% with a hemoglobin of 12.3 g/dL and medical evaluation was suggested.  His hemoglobin A1c was 6.6% and medical evaluation was suggested.  He is free testosterone was 7.2 pg/mL with a total testosterone of 237 ng/dL, PSA 3.93 ng/mL, prolactin 9.6 ng/L, TSH 1.15 µIU/L, estradiol 23 pg/mL, vitamin D 25 was 39.8 ng/mL, LH 4.9 IU/L, CRP 1.02 mg.  Lipoprotein a was 192.2 nmol/L admitted evaluation was suggested.  I have asked him to reach out to his medical doctor to discuss his anemia, lipoprotein a level.  He will follow-up with Dr. Serrano about his PSA of 3.93 ng/mL.\par \par Consultation: 30 minutes:  10 minutes reviewing his history and performing a physical examination.  20 minutes reviewing the ultrasound, writing for prescription medications ,discussing treatment options and writing his note. There was also additional time in preparation for today's visit.\par \par

## 2023-05-09 NOTE — HISTORY OF PRESENT ILLNESS
[FreeTextEntry1] : The patient returns for follow-up to discuss options for therapy. \par \par PMH: Patient is a 63-year-old gentleman who presents with a chief complaint of erectile dysfunction.  He had a recent penile duplex ultrasound that suggested arteriogenic dysfunction. He uses high dose trimix and 20 mg of Cialis for relations.  He states that this has been present for the past 2 years. It causes both he and his partner great stress.  I reviewed the questionnaire he completed in detail. His erections are not modified with the degree of sexual stimulation.   He states that his erections presently are often less than 0 out of 10 in both tumescence and rigidity, insufficient for penetration.   He often ejaculates through a flaccid phallus.  He has difficulty maintaining an erection. He describes a normal libido.  His sexual dysfunction occurs with both sexual relations and masturbation.  His erections are not improved with PDE5 inhibitors.    His partner is understanding and was unable to be with him at the visit today. He is not  and has adult children.  \par \par The patient denies fevers, chills, nausea and/or vomiting and no unexplained weight loss.  His past medical history is non-contributory.  In his present occupation he has no known toxin exposure. He does not smoke and drinks socially.  He has no known drug allergies. His review of systems and past medical history demonstrates no significant urologic issues (see patient completed questionnaire). His family history demonstrates no significant urologic issues.

## 2023-05-10 DIAGNOSIS — E11.9 TYPE 2 DIABETES MELLITUS WITHOUT COMPLICATIONS: ICD-10-CM

## 2023-05-10 DIAGNOSIS — N52.9 MALE ERECTILE DYSFUNCTION, UNSPECIFIED: ICD-10-CM

## 2023-06-02 NOTE — BRIEF OPERATIVE NOTE - ESTIMATED BLOOD LOSS
LVM informing pt med was switched   
Psoriasis vulgaris  -     mometasone (ELOCON) 0.1 % solution; Apply solution to damp scalp qhs prn scaling. Can cover with shower cap after application.  Dispense: 60 mL; Refill: 3          ----- Message from Monroe Kennedy MA sent at 6/2/2023 11:41 AM CDT -----  Regarding: FW: New Prescription  Contact: [facility]: [phone]  Can we send the mometasone for her?  ----- Message -----  From: Alonzo Yip  Sent: 6/2/2023  10:40 AM CDT  To: Genna Zuniga Staff  Subject: New Prescription                                 Calling in regards to rx DERMA-SMOOTHE/FS SCALP OIL 0.01 % Oil. Pts insurance does not cover this rx. Pharmacy states hydrocortisone and mometasone is covered. Please send in different rx and call to confirm    [facility] MARIANO SEVILLA  [address]  [place] [address]  Phone: [phone] Fax: [phone]          
[address]

## 2023-06-20 ENCOUNTER — NON-APPOINTMENT (OUTPATIENT)
Age: 63
End: 2023-06-20

## 2023-06-20 ENCOUNTER — APPOINTMENT (OUTPATIENT)
Dept: HEART AND VASCULAR | Facility: CLINIC | Age: 63
End: 2023-06-20
Payer: COMMERCIAL

## 2023-06-20 VITALS
SYSTOLIC BLOOD PRESSURE: 126 MMHG | HEIGHT: 72 IN | BODY MASS INDEX: 28.17 KG/M2 | TEMPERATURE: 98 F | DIASTOLIC BLOOD PRESSURE: 64 MMHG | WEIGHT: 208 LBS | OXYGEN SATURATION: 97 % | HEART RATE: 77 BPM

## 2023-06-20 DIAGNOSIS — Z13.6 ENCOUNTER FOR SCREENING FOR CARDIOVASCULAR DISORDERS: ICD-10-CM

## 2023-06-20 PROCEDURE — 36415 COLL VENOUS BLD VENIPUNCTURE: CPT

## 2023-06-20 PROCEDURE — 93000 ELECTROCARDIOGRAM COMPLETE: CPT

## 2023-06-20 PROCEDURE — 99204 OFFICE O/P NEW MOD 45 MIN: CPT | Mod: 25

## 2023-06-20 RX ORDER — TADALAFIL 20 MG/1
20 TABLET ORAL
Qty: 30 | Refills: 6 | Status: DISCONTINUED | COMMUNITY
Start: 2022-01-04 | End: 2023-06-20

## 2023-06-20 RX ORDER — TADALAFIL 5 MG/1
5 TABLET ORAL
Qty: 90 | Refills: 3 | Status: DISCONTINUED | COMMUNITY
Start: 2022-10-20 | End: 2023-06-20

## 2023-06-20 RX ORDER — SILDENAFIL 100 MG/1
100 TABLET, FILM COATED ORAL DAILY
Qty: 10 | Refills: 0 | Status: DISCONTINUED | COMMUNITY
Start: 2019-06-05 | End: 2023-06-20

## 2023-06-20 RX ORDER — ALFUZOSIN HYDROCHLORIDE 10 MG/1
10 TABLET, EXTENDED RELEASE ORAL
Qty: 90 | Refills: 3 | Status: DISCONTINUED | COMMUNITY
Start: 2020-07-13 | End: 2023-06-20

## 2023-06-20 RX ORDER — PAPAVERINE HYDROCHLORIDE 30 MG/ML
30 INJECTION, SOLUTION INTRAVENOUS
Qty: 2 | Refills: 0 | Status: DISCONTINUED | COMMUNITY
Start: 2023-03-16 | End: 2023-06-20

## 2023-06-20 RX ORDER — PAPAVER/PHENTOLAMINE/ALPROSTAD 150-5-50
150-5-50 VIAL (EA) INTRACAVERNOSAL
Qty: 1 | Refills: 6 | Status: DISCONTINUED | COMMUNITY
Start: 2022-10-17 | End: 2023-06-20

## 2023-06-20 NOTE — HISTORY OF PRESENT ILLNESS
[FreeTextEntry1] : feels well- advised to get cardiac checkup\par hx HLD- he has mild anemia and hematologist told him may be due to statin and advised reducing dose to 20mg- no cp/sob

## 2023-06-20 NOTE — DISCUSSION/SUMMARY
[FreeTextEntry1] : EKG:NSR\par revewied labs\par his target LDL(given DM) is < 70mg%\par elevated Tg will repeat lipids\par for now continue Lipitor for now\par called Dr Blankenship(hematologist) and left message \par if indeed statin causing anemia will try repatha

## 2023-06-20 NOTE — ADDENDUM
[FreeTextEntry1] : spoke to Dr Blankenship- he thinks statin MAY be cause of anemia- suggests trial of 20mg and repeat counts

## 2023-06-21 LAB
CHOLEST SERPL-MCNC: 191 MG/DL
HDLC SERPL-MCNC: 65 MG/DL
LDLC SERPL CALC-MCNC: 86 MG/DL
NONHDLC SERPL-MCNC: 126 MG/DL
TRIGL SERPL-MCNC: 198 MG/DL

## 2023-08-22 ENCOUNTER — APPOINTMENT (OUTPATIENT)
Dept: HEART AND VASCULAR | Facility: CLINIC | Age: 63
End: 2023-08-22
Payer: COMMERCIAL

## 2023-08-22 VITALS
HEART RATE: 67 BPM | BODY MASS INDEX: 27.63 KG/M2 | OXYGEN SATURATION: 97 % | DIASTOLIC BLOOD PRESSURE: 77 MMHG | HEIGHT: 72 IN | SYSTOLIC BLOOD PRESSURE: 133 MMHG | TEMPERATURE: 98 F | WEIGHT: 204 LBS

## 2023-08-22 DIAGNOSIS — I07.1 RHEUMATIC TRICUSPID INSUFFICIENCY: ICD-10-CM

## 2023-08-22 DIAGNOSIS — D64.9 ANEMIA, UNSPECIFIED: ICD-10-CM

## 2023-08-22 DIAGNOSIS — E78.5 HYPERLIPIDEMIA, UNSPECIFIED: ICD-10-CM

## 2023-08-22 PROCEDURE — 93306 TTE W/DOPPLER COMPLETE: CPT

## 2023-08-22 PROCEDURE — 93000 ELECTROCARDIOGRAM COMPLETE: CPT

## 2023-08-22 PROCEDURE — 99214 OFFICE O/P EST MOD 30 MIN: CPT | Mod: 25

## 2023-08-22 RX ORDER — SEMAGLUTIDE 1.34 MG/ML
2 INJECTION, SOLUTION SUBCUTANEOUS
Refills: 0 | Status: ACTIVE | COMMUNITY

## 2023-08-22 NOTE — DISCUSSION/SUMMARY
[FreeTextEntry1] : EKG:NSR he will see hematologist and get checked for anemia- if no improvement on statin ( and if LDL not controlled,target < 70mg%) would increase dose if anemia improved would try Repatha TTE;TR,normal LVEF

## 2023-08-31 ENCOUNTER — RX RENEWAL (OUTPATIENT)
Age: 63
End: 2023-08-31

## 2023-12-19 ENCOUNTER — APPOINTMENT (OUTPATIENT)
Dept: HEART AND VASCULAR | Facility: CLINIC | Age: 63
End: 2023-12-19
Payer: COMMERCIAL

## 2023-12-19 VITALS
WEIGHT: 197 LBS | DIASTOLIC BLOOD PRESSURE: 80 MMHG | TEMPERATURE: 97.2 F | HEART RATE: 73 BPM | OXYGEN SATURATION: 97 % | SYSTOLIC BLOOD PRESSURE: 148 MMHG | BODY MASS INDEX: 26.68 KG/M2 | HEIGHT: 72 IN

## 2023-12-19 VITALS — DIASTOLIC BLOOD PRESSURE: 78 MMHG | SYSTOLIC BLOOD PRESSURE: 120 MMHG

## 2023-12-19 VITALS — DIASTOLIC BLOOD PRESSURE: 84 MMHG | SYSTOLIC BLOOD PRESSURE: 136 MMHG

## 2023-12-19 DIAGNOSIS — E11.9 TYPE 2 DIABETES MELLITUS W/OUT COMPLICATIONS: ICD-10-CM

## 2023-12-19 DIAGNOSIS — E78.2 MIXED HYPERLIPIDEMIA: ICD-10-CM

## 2023-12-19 DIAGNOSIS — R03.0 ELEVATED BLOOD-PRESSURE READING, W/OUT DIAGNOSIS OF HYPERTENSION: ICD-10-CM

## 2023-12-19 PROCEDURE — 99214 OFFICE O/P EST MOD 30 MIN: CPT | Mod: 25

## 2023-12-19 PROCEDURE — 36415 COLL VENOUS BLD VENIPUNCTURE: CPT

## 2023-12-19 PROCEDURE — 93000 ELECTROCARDIOGRAM COMPLETE: CPT

## 2023-12-19 NOTE — DISCUSSION/SUMMARY
[FreeTextEntry1] : EKG:NSR His initial BP were elevated- drinks 3+ cups coffee- advised to reduce caffeine for BP continue crestor for HLD- check labs and a1c for DM

## 2023-12-20 LAB
ALBUMIN SERPL ELPH-MCNC: 4.6 G/DL
ALP BLD-CCNC: 82 U/L
ALT SERPL-CCNC: 32 U/L
ANION GAP SERPL CALC-SCNC: 10 MMOL/L
AST SERPL-CCNC: 26 U/L
BILIRUB SERPL-MCNC: 0.4 MG/DL
BUN SERPL-MCNC: 14 MG/DL
CALCIUM SERPL-MCNC: 10.2 MG/DL
CHLORIDE SERPL-SCNC: 104 MMOL/L
CHOLEST SERPL-MCNC: 162 MG/DL
CO2 SERPL-SCNC: 26 MMOL/L
CREAT SERPL-MCNC: 1.11 MG/DL
EGFR: 76 ML/MIN/1.73M2
ESTIMATED AVERAGE GLUCOSE: 126 MG/DL
GLUCOSE SERPL-MCNC: 86 MG/DL
HBA1C MFR BLD HPLC: 6 %
HDLC SERPL-MCNC: 68 MG/DL
LDLC SERPL CALC-MCNC: 76 MG/DL
NONHDLC SERPL-MCNC: 94 MG/DL
POTASSIUM SERPL-SCNC: 4.9 MMOL/L
PROT SERPL-MCNC: 6.9 G/DL
SODIUM SERPL-SCNC: 140 MMOL/L
TRIGL SERPL-MCNC: 101 MG/DL

## 2024-02-16 NOTE — H&P PST ADULT - BP NONINVASIVE DIASTOLIC (MM HG)
"[facility] Hospital Medicine History & Physical Examination       Patient Name: Arturo Ortiz  MRN: [id number]  Patient Class: IP- Inpatient   Admission Date: 2/16/2024   Admitting Physician: Eric Alejo MD   Length of Stay: 0  Attending Physician: Eric Alejo MD   Primary Care Provider: CHARISSE Wallace Jr., MD  Face-to-Face encounter date: 02/16/2024  Code Status: Full Code   Chief Complaint: Shortness of Breath (C/o SOB worsening over the last week. Pt report saw PCP on Thursday of last week - XR at that time showed "fluid on the L side of my lungs" - Rx'd lasix at the time but no improvement. Pt reports orthopnea, worsening sob w/ exertion, and intermittent episodes of chest pain w/ exertion.)        Patient information was obtained from patient, patient's family, past medical records and ER records.       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 Saw PCP last week, had CXR showed pul congestion and oral Lasix was tried but symptoms cont to worsens. Also c/o associated intermittent exertional chest pain. On arrival, pt was afebrile, mod hypertensive with normal HR, RR, 97% on RA. CTA chest was neg for PE but showed evidence of interstitial edema with trace pleural effusion.  Labs showed normal WBC, Creatinine 1.0, normal LFTs, , troponin 0,05>0.045. Pt does not follow any specific diet and unsure of  salt consumption    On exam, no distress noted, respiration is even with no use of accessory muscles , Lungs with crackles at both bases and trace or no lower ext edema     A/P-  Acute on chronic diastolic heart failure   Recent h/o Infective aortic valve endocarditis and Aerococcus urinae " bacteremia- s/p treatment   Elevated troponin / NSTEMI type 2 in the setting of HF  Normocytic anemia   Obesity, BMI 37.7    Hx- HTN, DM2, HLD, Hypothyroidism,     Plan-  Follow up Echocardiogram   Continue Lasix IV 40 mg q12h   Start ASA, BB, ARB, Stain   Cardiology is following   Home meds are reviewed and resumed   Advised low salt diet     Critical care note:  Critical care diagnosis: HF requiring IV lasix   Critical care interventions: Hands-on evaluation, review of labs/radiographs/records and discussion with patient and family if present  Critical care time spent: 35 minutes        HISTORY OF PRESENT ILLNESS:   Arturo Ortiz is a 68 y.o. White male with a past medical history of hypertension, hyperlipidemia, diabetes mellitus type 2, diastolic heart failure grade I. Patient had admission to hospital medicine services from 12/23/2023 to 01/02/2024 for infective aortic valve endocarditis, aerococcus urinae bacteremia and recurrent UTI/right sided pyelonephritis.  Patient completed 6 week course IV Rocephin on 02/09/2024. The patient presented to [facility] on 2/16/2024 with a primary complaint of shortness of breath which has been ongoing for the last week.  Patient reports mostly experiencing dyspnea when lying flat or with exertion.  He was having a nonproductive cough and centralized chest pain which started paroxysmally week ago described as dull in nature without radiation and rated 0/10 on exam.  He denies complaints of fever, chills, nausea, vomiting and diarrhea.  Patient states he was seen by his primary care provider who prescribed him albuterol inhaler and Lasix 20 mg twice a day which he has been compliant with.  Patient was in the restroom and wiping himself with his right hand which had the pulse ox type 2 his finger which he states resulted in a tear near his rectum which was bleeding. Patient is concerned that this will lead to another infection in his blood.     Upon presentation to the ED,  temperature 97.7° F, heart rate 64, blood pressure 161/61, respiratory rate 18 and SpO2 97% on room air.  Labs with H&H 13.5/39.5, .8, troponin 0.045.  Influenza A/B and SARS-COV-2 PCR negative.  EKG normal sinus rhythm and T-wave abnormalities considering lateral ischemia.  Chest x-ray with no acute findings.  CTA of the chest negative for pulmonary embolism but findings suggestive of interstitial edema and trace pleural effusions.  In ED patient received 40 mg of IV Lasix.  Patient is admitted to hospital medicine services for further medical management.    PAST MEDICAL HISTORY:     Past Medical History:   Diagnosis Date    Avascular necrosis of right femoral head     DM (diabetes mellitus)     Essential (primary) hypertension     Hypercholesteremia     Macrocytosis     Osteoarthritis of right hip     Osteoarthritis of right knee     Peripheral neuropathy        PAST SURGICAL HISTORY:     Past Surgical History:   Procedure Laterality Date    EYE SURGERY      JOINT REPLACEMENT      Hip replacement    TOTAL HIP ARTHROPLASTY Right 05/18/2021   Colonoscopy    ALLERGIES:   Patient has no known allergies.    FAMILY HISTORY:   Father: Hodgkin's lymphoma    SOCIAL HISTORY:   Denies tobacco, alcohol and illicit drug use      HOME MEDICATIONS:     Prior to Admission medications    Medication Sig Start Date End Date Taking? Authorizing Provider   albuterol (PROVENTIL/VENTOLIN HFA) 90 mcg/actuation inhaler Inhale 2 puffs into the lungs every 4 (four) hours as needed for Wheezing. 2/7/24   CHARISSE Wallace Jr., MD   cefTRIAXone (ROCEPHIN) 10 gram injection  1/18/24   Provider, Pk   fluconazole (DIFLUCAN) 150 MG Tab Take 1 tablet (150 mg total) by mouth every 72 hours as needed (itching).  Patient not taking: Reported on 2/7/2024 1/23/24   Rickey Camejo, LATIA   furosemide (LASIX) 20 MG tablet Take 1 tablet (20 mg total) by mouth 2 (two) times daily. 2/14/24 2/13/25  CHARISSE Wallace Jr., MD   heparin,  PORCINE, (HEPARIN, PORCINE,)     Provider, Historical   levothyroxine (SYNTHROID) 50 MCG tablet Take 1 tablet (50 mcg total) by mouth before breakfast. 12/7/23   CHARISSE Wallace Jr., MD   multivitamin (ONE DAILY MULTIVITAMIN) per tablet Take 1 tablet by mouth once daily.    Provider, Historical       REVIEW OF SYSTEMS:   Except as documented, all other systems reviewed and negative     PHYSICAL EXAM:     VITAL SIGNS: 24 HRS MIN & MAX LAST   Temp  Min: 97.7 °F (36.5 °C)  Max: 97.7 °F (36.5 °C) 97.7 °F (36.5 °C)   BP  Min: 161/53  Max: 188/59 (!) 161/53   Pulse  Min: 61  Max: 69  62   Resp  Min: 15  Max: 19 19   SpO2  Min: 97 %  Max: 98 % 97 %       General appearance: Well-developed, well-nourished male in no apparent distress.  No family at bedside.  Sitting on side of bed  HEENT: Atraumatic head. Moist mucous membranes of oral cavity.  Lungs: Clear to auscultation bilaterally.   Heart: Regular rate and rhythm.  No pitting edema bilateral lower extremities  Abdomen: Soft, non-distended.  Genitourinary:  Noted bleeding or tear anorectum.  Chaperone: Hudson Keenan.  Extremities: No cyanosis, clubbing. No deformities.  Skin: No Rash. Warm and dry.  Neuro: Awake, alert and oriented. Motor and sensory exams grossly intact.  Psych/mental status: Appropriate mood and affect. Cooperative. Responds appropriately to questions.       LABS AND IMAGING:     Recent Labs   Lab 02/16/24  0746   WBC 7.63   RBC 4.09*   HGB 13.5*   HCT 39.5*   MCV 96.6*   MCH 33.0*   MCHC 34.2   RDW 15.6      MPV 9.8       Recent Labs   Lab 02/16/24  0746      K 3.9   CO2 27   BUN 27.4*   CREATININE 1.07   CALCIUM 9.2   ALBUMIN 3.8   ALKPHOS 45   ALT 21   AST 25   BILITOT 0.5       Microbiology Results (last 7 days)       ** No results found for the last 168 hours. **             CTA Chest Non-Coronary (PE Studies)  Narrative: EXAMINATION:  CTA CHEST NON CORONARY (PE STUDIES)    CLINICAL HISTORY:  Pulmonary embolism (PE) suspected,  neg D-dimer;    TECHNIQUE:  Helically acquired images with axial, sagittal and coronal reformations were obtained from the thoracic inlet to the lung bases followingthe IV administration of contrast.  CTA timed for evaluation of the pulmonary arteries.  MIP images were performed.    Automated tube current modulation, weight-based exposure dosing, and/or iterative reconstruction technique utilized to reach lowest reasonably achievable exposure rate.    DLP: 421 mGy*cm    COMPARISON:  CTA chest 12/24/2023    FINDINGS:  BASE OF NECK: No significant abnormality.    AORTA: Left-sided aortic arch.  No aneurysm and no significant atherosclerosis    PULMONARY VASCULATURE: Pulmonary arteries enhance normally.  No evidence of pulmonary embolus.    HEART: There are calcifications at the mitral valve annulus and papillary muscle.    VIVIAN/MEDIASTINUM: No enlarged lymph nodes by size criteria.    AIRWAYS: Mild bronchial wall thickening.    LUNGS/PLEURA: There small dependently layering pleural effusions.  Mild basilar septal thickening and ground-glass densities.    UPPER ABDOMEN: No abnormality of the partially imaged upper abdomen.    THORACIC SOFT TISSUES: Unremarkable.    BONES: No acute fracture. No suspicious lytic or sclerotic lesions.  Impression: 1. No evidence of pulmonary embolus  2. Findings most suggestive of interstitial edema.  Correlate with BNP.  Trace pleural effusions.    Electronically signed by: Ysabel Ventura  Date:    02/16/2024  Time:    13:27  X-Ray Chest AP  Narrative: EXAMINATION:  XR CHEST AP PORTABLE    CLINICAL HISTORY:  Sob;    COMPARISON:  7 February 2024    FINDINGS:  Frontal view of the chest was obtained. The heart is not significantly enlarged.  There is aortic atherosclerosis.  Lungs are grossly clear.  There is no pneumothorax.  Impression: No acute findings.    Electronically signed by: Arturo Mcdonald  Date:    02/16/2024  Time:    07:13        ASSESSMENT & PLAN:   Assessment:  Acute  diastolic heart failure exacerbation  Small pleural effusions bilaterally secondary to above   Normocytic anemia, stable   History of hypertension, hyperlipidemia, diabetes mellitus type 2, aortic valve endocarditis in December 2023,    Plan:  - Repeat echo ordered. Echo 02/01/2024 with LVEF 55%, grade 1 diastolic dysfunction, moderate aortic valve stenosis, moderate to severe aortic regurgitation, mild-to-moderate mitral valve regurgitation and trace tricuspid regurgitation.  - Strict I&Os and daily weights   - IV Lasix 40 mg twice a day   - Cardiology consulted.  Appreciate recommendations  - Accu-Cheks and sliding scale  - Resume appropriate home medications when deemed necessary   - Labs in AM      VTE Prophylaxis: will be placed on Lovenox for DVT prophylaxis and will be advised to be as mobile as possible and sit in a chair as tolerated      __________________________________________________________________________  INPATIENT LIST OF MEDICATIONS     Current Facility-Administered Medications:     hydrALAZINE injection 10 mg, 10 mg, Intravenous, Q2H PRN, Darya Mendoza, BRENDON    Current Outpatient Medications:     albuterol (PROVENTIL/VENTOLIN HFA) 90 mcg/actuation inhaler, Inhale 2 puffs into the lungs every 4 (four) hours as needed for Wheezing., Disp: 18 g, Rfl: 1    cefTRIAXone (ROCEPHIN) 10 gram injection, , Disp: , Rfl:     fluconazole (DIFLUCAN) 150 MG Tab, Take 1 tablet (150 mg total) by mouth every 72 hours as needed (itching). (Patient not taking: Reported on 2/7/2024), Disp: 2 tablet, Rfl: 0    furosemide (LASIX) 20 MG tablet, Take 1 tablet (20 mg total) by mouth 2 (two) times daily., Disp: 30 tablet, Rfl: 0    heparin, PORCINE, (HEPARIN, PORCINE,), , Disp: , Rfl:     levothyroxine (SYNTHROID) 50 MCG tablet, Take 1 tablet (50 mcg total) by mouth before breakfast., Disp: 30 tablet, Rfl: 5    multivitamin (ONE DAILY MULTIVITAMIN) per tablet, Take 1 tablet by mouth once daily., Disp: , Rfl:        Scheduled Meds:  Continuous Infusions:  PRN Meds:.hydrALAZINE      Discharge Planning and Disposition: Anticipated discharge to be determined.    IDarya PA, have reviewed and discussed the case with Dr. Eric Alejo MD    Please see the following addendum for further assessment and plan from there attending MD.    Darya Mendoza PA-C  02/16/2024   76

## 2024-02-27 ENCOUNTER — RESULT REVIEW (OUTPATIENT)
Age: 64
End: 2024-02-27

## 2024-02-27 RX ORDER — ATORVASTATIN CALCIUM 40 MG/1
40 TABLET, FILM COATED ORAL
Qty: 1 | Refills: 0 | Status: ACTIVE | COMMUNITY
Start: 2020-03-02 | End: 1900-01-01

## 2024-03-06 ENCOUNTER — APPOINTMENT (OUTPATIENT)
Dept: CT IMAGING | Facility: IMAGING CENTER | Age: 64
End: 2024-03-06
Payer: COMMERCIAL

## 2024-03-06 ENCOUNTER — OUTPATIENT (OUTPATIENT)
Dept: OUTPATIENT SERVICES | Facility: HOSPITAL | Age: 64
LOS: 1 days | End: 2024-03-06
Payer: COMMERCIAL

## 2024-03-06 DIAGNOSIS — Z98.890 OTHER SPECIFIED POSTPROCEDURAL STATES: Chronic | ICD-10-CM

## 2024-03-06 DIAGNOSIS — C64.9 MALIGNANT NEOPLASM OF UNSPECIFIED KIDNEY, EXCEPT RENAL PELVIS: ICD-10-CM

## 2024-03-06 PROCEDURE — 71260 CT THORAX DX C+: CPT

## 2024-03-06 PROCEDURE — 74170 CT ABD WO CNTRST FLWD CNTRST: CPT | Mod: 26

## 2024-03-06 PROCEDURE — 71260 CT THORAX DX C+: CPT | Mod: 26

## 2024-03-06 PROCEDURE — 74170 CT ABD WO CNTRST FLWD CNTRST: CPT

## 2024-03-06 PROCEDURE — 82565 ASSAY OF CREATININE: CPT

## 2024-03-26 ENCOUNTER — APPOINTMENT (OUTPATIENT)
Dept: UROLOGY | Facility: CLINIC | Age: 64
End: 2024-03-26
Payer: COMMERCIAL

## 2024-03-26 DIAGNOSIS — N40.1 BENIGN PROSTATIC HYPERPLASIA WITH LOWER URINARY TRACT SYMPMS: ICD-10-CM

## 2024-03-26 DIAGNOSIS — C64.9 MALIGNANT NEOPLASM OF UNSPECIFIED KIDNEY, EXCEPT RENAL PELVIS: ICD-10-CM

## 2024-03-26 DIAGNOSIS — R35.1 BENIGN PROSTATIC HYPERPLASIA WITH LOWER URINARY TRACT SYMPMS: ICD-10-CM

## 2024-03-26 PROCEDURE — 99214 OFFICE O/P EST MOD 30 MIN: CPT

## 2024-03-26 PROCEDURE — 51798 US URINE CAPACITY MEASURE: CPT

## 2024-03-26 RX ORDER — ALFUZOSIN HYDROCHLORIDE 10 MG/1
10 TABLET, EXTENDED RELEASE ORAL
Qty: 90 | Refills: 2 | Status: ACTIVE | COMMUNITY
Start: 2024-03-26 | End: 1900-01-01

## 2024-03-26 NOTE — HISTORY OF PRESENT ILLNESS
[FreeTextEntry1] : 64 year old s/p L partial 9/9/20   Path: 4.5cm clear cell pT1b G2  CTAP: no evidence of recurrence  Outside labs reviewed: PSA: 4.4 from 4.2 Cr 1.14  Stopped taking alfuzosin, Reports no issues urinating, emptying well. Drinks lots of water to prevent kidney stones.  PVR 86 (previously 0 when on alfuzosin)

## 2024-03-26 NOTE — ASSESSMENT
[FreeTextEntry1] : 64 year old s/p L partial 9/9/20 pT1b G2 CTAP this year demonstrates no evidence of recurrence  PVR 86 (previously 0 on alfuzosin)  -restart alfuzosin, refill sent to pharmacy -FU in 1 year with renal US

## 2025-02-06 ENCOUNTER — APPOINTMENT (OUTPATIENT)
Dept: HEART AND VASCULAR | Facility: CLINIC | Age: 65
End: 2025-02-06
Payer: COMMERCIAL

## 2025-02-06 ENCOUNTER — NON-APPOINTMENT (OUTPATIENT)
Age: 65
End: 2025-02-06

## 2025-02-06 VITALS
BODY MASS INDEX: 26.95 KG/M2 | OXYGEN SATURATION: 95 % | WEIGHT: 199 LBS | DIASTOLIC BLOOD PRESSURE: 75 MMHG | HEIGHT: 72 IN | HEART RATE: 79 BPM | SYSTOLIC BLOOD PRESSURE: 128 MMHG | TEMPERATURE: 97.4 F

## 2025-02-06 DIAGNOSIS — I07.1 RHEUMATIC TRICUSPID INSUFFICIENCY: ICD-10-CM

## 2025-02-06 DIAGNOSIS — E78.2 MIXED HYPERLIPIDEMIA: ICD-10-CM

## 2025-02-06 PROCEDURE — 99214 OFFICE O/P EST MOD 30 MIN: CPT | Mod: 25

## 2025-02-06 PROCEDURE — 93306 TTE W/DOPPLER COMPLETE: CPT

## 2025-02-06 PROCEDURE — 93000 ELECTROCARDIOGRAM COMPLETE: CPT

## 2025-02-06 RX ORDER — DEXLANSOPRAZOLE 60 MG/1
60 CAPSULE, DELAYED RELEASE ORAL
Refills: 0 | Status: ACTIVE | COMMUNITY

## 2025-03-06 ENCOUNTER — APPOINTMENT (OUTPATIENT)
Dept: CT IMAGING | Facility: IMAGING CENTER | Age: 65
End: 2025-03-06

## 2025-03-06 ENCOUNTER — OUTPATIENT (OUTPATIENT)
Dept: OUTPATIENT SERVICES | Facility: HOSPITAL | Age: 65
LOS: 1 days | End: 2025-03-06
Payer: COMMERCIAL

## 2025-03-06 DIAGNOSIS — Z00.8 ENCOUNTER FOR OTHER GENERAL EXAMINATION: ICD-10-CM

## 2025-03-06 DIAGNOSIS — C64.9 MALIGNANT NEOPLASM OF UNSPECIFIED KIDNEY, EXCEPT RENAL PELVIS: ICD-10-CM

## 2025-03-06 DIAGNOSIS — Z98.890 OTHER SPECIFIED POSTPROCEDURAL STATES: Chronic | ICD-10-CM

## 2025-03-06 PROCEDURE — 74150 CT ABDOMEN W/O CONTRAST: CPT

## 2025-03-06 PROCEDURE — 74150 CT ABDOMEN W/O CONTRAST: CPT | Mod: 26

## 2025-03-08 ENCOUNTER — NON-APPOINTMENT (OUTPATIENT)
Age: 65
End: 2025-03-08

## 2025-03-08 DIAGNOSIS — N52.9 MALE ERECTILE DYSFUNCTION, UNSPECIFIED: ICD-10-CM

## 2025-03-08 DIAGNOSIS — N52.01 ERECTILE DYSFUNCTION DUE TO ARTERIAL INSUFFICIENCY: ICD-10-CM

## 2025-03-08 PROCEDURE — 99214 OFFICE O/P EST MOD 30 MIN: CPT | Mod: 95

## 2025-03-08 PROCEDURE — G2211 COMPLEX E/M VISIT ADD ON: CPT | Mod: NC,95

## 2025-03-11 ENCOUNTER — OUTPATIENT (OUTPATIENT)
Dept: OUTPATIENT SERVICES | Facility: HOSPITAL | Age: 65
LOS: 1 days | End: 2025-03-11

## 2025-03-11 ENCOUNTER — APPOINTMENT (OUTPATIENT)
Dept: UROLOGY | Facility: CLINIC | Age: 65
End: 2025-03-11

## 2025-03-11 ENCOUNTER — APPOINTMENT (OUTPATIENT)
Dept: UROLOGY | Facility: CLINIC | Age: 65
End: 2025-03-11
Payer: COMMERCIAL

## 2025-03-11 DIAGNOSIS — N13.8 BENIGN PROSTATIC HYPERPLASIA WITH LOWER URINARY TRACT SYMPMS: ICD-10-CM

## 2025-03-11 DIAGNOSIS — R97.20 ELEVATED PROSTATE, SPECIFIC ANTIGEN [PSA]: ICD-10-CM

## 2025-03-11 DIAGNOSIS — N40.1 BENIGN PROSTATIC HYPERPLASIA WITH LOWER URINARY TRACT SYMPMS: ICD-10-CM

## 2025-03-11 DIAGNOSIS — Z98.890 OTHER SPECIFIED POSTPROCEDURAL STATES: Chronic | ICD-10-CM

## 2025-03-11 DIAGNOSIS — C64.9 MALIGNANT NEOPLASM OF UNSPECIFIED KIDNEY, EXCEPT RENAL PELVIS: ICD-10-CM

## 2025-03-11 DIAGNOSIS — R35.0 FREQUENCY OF MICTURITION: ICD-10-CM

## 2025-03-11 PROCEDURE — 99214 OFFICE O/P EST MOD 30 MIN: CPT | Mod: 25,95

## 2025-03-11 PROCEDURE — 99215 OFFICE O/P EST HI 40 MIN: CPT

## 2025-03-12 PROBLEM — R97.20 HIGH PROSTATE SPECIFIC ANTIGEN (PSA): Status: ACTIVE | Noted: 2020-07-11

## 2025-03-12 LAB
25(OH)D3 SERPL-MCNC: 38.9 NG/ML
ANION GAP SERPL CALC-SCNC: 13 MMOL/L
BUN SERPL-MCNC: 19 MG/DL
CALCIUM SERPL-MCNC: 9.9 MG/DL
CHLORIDE SERPL-SCNC: 106 MMOL/L
CO2 SERPL-SCNC: 24 MMOL/L
CREAT SERPL-MCNC: 1.46 MG/DL
EGFRCR SERPLBLD CKD-EPI 2021: 54 ML/MIN/1.73M2
ESTRADIOL SERPL-MCNC: 23 PG/ML
GLUCOSE SERPL-MCNC: 132 MG/DL
LH SERPL-ACNC: 4.8 IU/L
POTASSIUM SERPL-SCNC: 4.9 MMOL/L
PROLACTIN SERPL-MCNC: 8.1 NG/ML
PSA SERPL-MCNC: 4.79 NG/ML
SODIUM SERPL-SCNC: 142 MMOL/L

## 2025-03-14 LAB
TESTOST FREE SERPL-MCNC: 5.6 PG/ML
TESTOST SERPL-MCNC: 406 NG/DL

## 2025-03-17 ENCOUNTER — APPOINTMENT (OUTPATIENT)
Dept: MRI IMAGING | Facility: IMAGING CENTER | Age: 65
End: 2025-03-17
Payer: COMMERCIAL

## 2025-03-17 ENCOUNTER — RESULT REVIEW (OUTPATIENT)
Age: 65
End: 2025-03-17

## 2025-03-17 ENCOUNTER — OUTPATIENT (OUTPATIENT)
Dept: OUTPATIENT SERVICES | Facility: HOSPITAL | Age: 65
LOS: 1 days | End: 2025-03-17
Payer: COMMERCIAL

## 2025-03-17 DIAGNOSIS — R97.20 ELEVATED PROSTATE SPECIFIC ANTIGEN [PSA]: ICD-10-CM

## 2025-03-17 DIAGNOSIS — Z98.890 OTHER SPECIFIED POSTPROCEDURAL STATES: Chronic | ICD-10-CM

## 2025-03-17 DIAGNOSIS — Z00.8 ENCOUNTER FOR OTHER GENERAL EXAMINATION: ICD-10-CM

## 2025-03-17 PROCEDURE — A9585: CPT

## 2025-03-17 PROCEDURE — 72197 MRI PELVIS W/O & W/DYE: CPT

## 2025-03-17 PROCEDURE — 76498P: CUSTOM | Mod: 26

## 2025-03-17 PROCEDURE — 72197 MRI PELVIS W/O & W/DYE: CPT | Mod: 26

## 2025-03-17 PROCEDURE — 76498 UNLISTED MR PROCEDURE: CPT

## 2025-03-25 ENCOUNTER — APPOINTMENT (OUTPATIENT)
Dept: NEPHROLOGY | Facility: CLINIC | Age: 65
End: 2025-03-25
Payer: COMMERCIAL

## 2025-03-25 VITALS — HEART RATE: 80 BPM | SYSTOLIC BLOOD PRESSURE: 103 MMHG | DIASTOLIC BLOOD PRESSURE: 68 MMHG

## 2025-03-25 DIAGNOSIS — N20.9 URINARY CALCULUS, UNSPECIFIED: ICD-10-CM

## 2025-03-25 DIAGNOSIS — N18.9 CHRONIC KIDNEY DISEASE, UNSPECIFIED: ICD-10-CM

## 2025-03-25 PROCEDURE — 99205 OFFICE O/P NEW HI 60 MIN: CPT

## 2025-03-25 PROCEDURE — G2211 COMPLEX E/M VISIT ADD ON: CPT | Mod: NC

## 2025-04-01 LAB
25(OH)D3 SERPL-MCNC: 30.8 NG/ML
ALBUMIN SERPL ELPH-MCNC: 4.3 G/DL
ANION GAP SERPL CALC-SCNC: 10 MMOL/L
APPEARANCE: CLEAR
BACTERIA: NEGATIVE /HPF
BASOPHILS # BLD AUTO: 0.05 K/UL
BASOPHILS NFR BLD AUTO: 0.8 %
BILIRUBIN URINE: NEGATIVE
BLOOD URINE: NEGATIVE
BUN SERPL-MCNC: 17 MG/DL
CALCIUM SERPL-MCNC: 9.7 MG/DL
CALCIUM SERPL-MCNC: 9.7 MG/DL
CAST: 0 /LPF
CHLORIDE SERPL-SCNC: 105 MMOL/L
CO2 SERPL-SCNC: 26 MMOL/L
COLOR: YELLOW
CREAT SERPL-MCNC: 1.33 MG/DL
CREAT SPEC-SCNC: 46 MG/DL
CREAT/PROT UR: 0.1 RATIO
EGFRCR SERPLBLD CKD-EPI 2021: 60 ML/MIN/1.73M2
EOSINOPHIL # BLD AUTO: 0.17 K/UL
EOSINOPHIL NFR BLD AUTO: 2.9 %
EPITHELIAL CELLS: 0 /HPF
GLUCOSE QUALITATIVE U: NEGATIVE MG/DL
GLUCOSE SERPL-MCNC: 91 MG/DL
HCT VFR BLD CALC: 36.7 %
HGB BLD-MCNC: 11.8 G/DL
IMM GRANULOCYTES NFR BLD AUTO: 0.2 %
KETONES URINE: NEGATIVE MG/DL
LEUKOCYTE ESTERASE URINE: NEGATIVE
LYMPHOCYTES # BLD AUTO: 1.62 K/UL
LYMPHOCYTES NFR BLD AUTO: 27.4 %
MAN DIFF?: NORMAL
MCHC RBC-ENTMCNC: 29.8 PG
MCHC RBC-ENTMCNC: 32.2 G/DL
MCV RBC AUTO: 92.7 FL
MICROSCOPIC-UA: NORMAL
MONOCYTES # BLD AUTO: 0.54 K/UL
MONOCYTES NFR BLD AUTO: 9.1 %
NEUTROPHILS # BLD AUTO: 3.52 K/UL
NEUTROPHILS NFR BLD AUTO: 59.6 %
NITRITE URINE: NEGATIVE
PARATHYROID HORMONE INTACT: 56 PG/ML
PH URINE: 6.5
PHOSPHATE SERPL-MCNC: 3.2 MG/DL
PLATELET # BLD AUTO: 297 K/UL
POTASSIUM SERPL-SCNC: 4.7 MMOL/L
PROT UR-MCNC: 4 MG/DL
PROTEIN URINE: NEGATIVE MG/DL
RBC # BLD: 3.96 M/UL
RBC # FLD: 13.2 %
RED BLOOD CELLS URINE: 0 /HPF
SODIUM SERPL-SCNC: 142 MMOL/L
SPECIFIC GRAVITY URINE: 1.01
UROBILINOGEN URINE: 0.2 MG/DL
WBC # FLD AUTO: 5.91 K/UL
WHITE BLOOD CELLS URINE: 0 /HPF

## 2025-04-15 ENCOUNTER — NON-APPOINTMENT (OUTPATIENT)
Age: 65
End: 2025-04-15

## 2025-05-08 ENCOUNTER — APPOINTMENT (OUTPATIENT)
Dept: UROLOGY | Facility: CLINIC | Age: 65
End: 2025-05-08
Payer: COMMERCIAL

## 2025-05-08 ENCOUNTER — NON-APPOINTMENT (OUTPATIENT)
Age: 65
End: 2025-05-08

## 2025-05-08 VITALS
DIASTOLIC BLOOD PRESSURE: 66 MMHG | HEIGHT: 72 IN | HEART RATE: 93 BPM | BODY MASS INDEX: 26.41 KG/M2 | SYSTOLIC BLOOD PRESSURE: 96 MMHG | WEIGHT: 195 LBS

## 2025-05-08 PROCEDURE — 99214 OFFICE O/P EST MOD 30 MIN: CPT

## 2025-05-08 PROCEDURE — G2211 COMPLEX E/M VISIT ADD ON: CPT | Mod: NC
